# Patient Record
Sex: FEMALE | Race: OTHER | HISPANIC OR LATINO | Employment: FULL TIME | URBAN - METROPOLITAN AREA
[De-identification: names, ages, dates, MRNs, and addresses within clinical notes are randomized per-mention and may not be internally consistent; named-entity substitution may affect disease eponyms.]

---

## 2024-03-29 ENCOUNTER — PATIENT MESSAGE (OUTPATIENT)
Dept: OBSTETRICS AND GYNECOLOGY | Facility: CLINIC | Age: 34
End: 2024-03-29
Payer: OTHER GOVERNMENT

## 2024-06-06 ENCOUNTER — OFFICE VISIT (OUTPATIENT)
Dept: OBSTETRICS AND GYNECOLOGY | Facility: CLINIC | Age: 34
End: 2024-06-06
Payer: OTHER GOVERNMENT

## 2024-06-06 VITALS
SYSTOLIC BLOOD PRESSURE: 118 MMHG | DIASTOLIC BLOOD PRESSURE: 60 MMHG | BODY MASS INDEX: 30.63 KG/M2 | WEIGHT: 151.69 LBS

## 2024-06-06 DIAGNOSIS — Z11.51 ENCOUNTER FOR SCREENING FOR HUMAN PAPILLOMAVIRUS (HPV): ICD-10-CM

## 2024-06-06 DIAGNOSIS — Z01.419 WOMEN'S ANNUAL ROUTINE GYNECOLOGICAL EXAMINATION: Primary | ICD-10-CM

## 2024-06-06 DIAGNOSIS — Z12.4 ENCOUNTER FOR PAPANICOLAOU SMEAR FOR CERVICAL CANCER SCREENING: ICD-10-CM

## 2024-06-06 DIAGNOSIS — Z31.41 FERTILITY TESTING: ICD-10-CM

## 2024-06-06 PROCEDURE — 99213 OFFICE O/P EST LOW 20 MIN: CPT | Mod: PBBFAC | Performed by: NURSE PRACTITIONER

## 2024-06-06 PROCEDURE — 99999 PR PBB SHADOW E&M-EST. PATIENT-LVL III: CPT | Mod: PBBFAC,,, | Performed by: NURSE PRACTITIONER

## 2024-06-06 PROCEDURE — 99395 PREV VISIT EST AGE 18-39: CPT | Mod: S$PBB,,, | Performed by: NURSE PRACTITIONER

## 2024-06-06 PROCEDURE — 88175 CYTOPATH C/V AUTO FLUID REDO: CPT | Performed by: NURSE PRACTITIONER

## 2024-06-06 PROCEDURE — 87624 HPV HI-RISK TYP POOLED RSLT: CPT | Performed by: NURSE PRACTITIONER

## 2024-06-06 NOTE — PROGRESS NOTES
CC: Annual    HPI: Pt is a 33 y.o.  female who presents for routine annual exam.   The patient participates in regular exercise: Yes.  The patient does not smoke.  The patient wears seatbelts.   Pt denies any domestic violence.    Last pap in 2021. Attempting to conceive since June 2023, positive OPKs reported, desires fertility assessment.      FH:  Breast cancer: none  Colon cancer: none  Ovarian cancer: none  Endometrial cancer: none    ROS:  GENERAL: Feeling well overall. Denies fever or chills.   SKIN: Denies rash or lesions.   HEAD: Denies head injury or headache.   NODES: Denies enlarged lymph nodes.   CHEST: Denies chest pain or shortness of breath.   CARDIOVASCULAR: Denies palpitations or left sided chest pain.   ABDOMEN: No abdominal pain, constipation, diarrhea, nausea, vomiting or rectal bleeding.   URINARY: No dysuria, hematuria, or burning on urination.  REPRODUCTIVE: See HPI.   BREASTS: Denies pain, lumps, or nipple discharge.   HEMATOLOGIC: No easy bruisability or excessive bleeding.   MUSCULOSKELETAL: Denies joint pain or swelling.   NEUROLOGIC: Denies syncope or weakness.   PSYCHIATRIC: Denies depression, anxiety or mood swings.    PE:   APPEARANCE: Well nourished, well developed, Other female in no acute distress.  NODES: no cervical, supraclavicular, or inguinal lymphadenopathy  BREASTS: Symmetrical, no skin changes or visible lesions. No palpable masses, nipple discharge or adenopathy bilaterally.  ABDOMEN: Soft. No tenderness or masses. No distention. No hernias palpated. No CVA tenderness.  VULVA: No lesions. Normal external female genitalia.  URETHRAL MEATUS: Normal size and location, no lesions, no prolapse.  URETHRA: No masses, tenderness, or prolapse.  VAGINA: Moist. No lesions or lacerations noted. No abnormal discharge present. No odor present.   CERVIX: No lesions or discharge. No cervical motion tenderness.   UTERUS: Normal size, regular shape, mobile, non-tender.  ADNEXA: No  tenderness. No fullness or masses palpated in the adnexal regions.   ANUS PERINEUM: Normal.      Diagnosis:  1. Women's annual routine gynecological examination    2. Encounter for screening for human papillomavirus (HPV)    3. Encounter for Papanicolaou smear for cervical cancer screening    4. Fertility testing        Plan:     Orders Placed This Encounter    HPV High Risk Genotypes, PCR    FOLLICLE STIMULATING HORMONE    ESTRADIOL    ANTIMULLERIAN HORMONE ([facility])    Prolactin    TSH    Liquid-Based Pap Smear, Screening     HPV/Pap    Day 3 labs ordered, SA with JANET, plan for HSG if initial testing negative. Will schedule day 21.     Patient was counseled today on the new ACS guidelines for cervical cytology screening as well as the current recommendations for breast cancer screening. She was counseled to follow up with her PCP for other routine health maintenance. Counseling session lasted approximately 10 minutes, and all her questions were answered.    Follow-up with me in 1 year for routine exam       BERTRAND Griffin

## 2024-06-11 ENCOUNTER — TELEPHONE (OUTPATIENT)
Dept: OBSTETRICS AND GYNECOLOGY | Facility: CLINIC | Age: 34
End: 2024-06-11
Payer: OTHER GOVERNMENT

## 2024-06-11 NOTE — TELEPHONE ENCOUNTER
----- Message from Mikayla Espinosa NP sent at 6/6/2024  9:17 AM CDT -----  Please set up partner through [facility]AN[facility] for SA

## 2024-06-12 ENCOUNTER — PATIENT MESSAGE (OUTPATIENT)
Dept: OBSTETRICS AND GYNECOLOGY | Facility: CLINIC | Age: 34
End: 2024-06-12
Payer: OTHER GOVERNMENT

## 2024-06-12 LAB
FINAL PATHOLOGIC DIAGNOSIS: NORMAL
HPV HR 12 DNA SPEC QL NAA+PROBE: NEGATIVE
HPV16 AG SPEC QL: NEGATIVE
HPV18 DNA SPEC QL NAA+PROBE: NEGATIVE
Lab: NORMAL

## 2024-06-13 ENCOUNTER — PATIENT MESSAGE (OUTPATIENT)
Dept: OBSTETRICS AND GYNECOLOGY | Facility: CLINIC | Age: 34
End: 2024-06-13
Payer: OTHER GOVERNMENT

## 2024-06-25 ENCOUNTER — PATIENT MESSAGE (OUTPATIENT)
Dept: OBSTETRICS AND GYNECOLOGY | Facility: CLINIC | Age: 34
End: 2024-06-25
Payer: OTHER GOVERNMENT

## 2024-06-27 NOTE — TELEPHONE ENCOUNTER
Contacted patient and scheduled her for labs on Monday. I informed her that I faxed over the referral for the semen analysis  and that she or her  would have to  the referral at [facility] or [facility] location along with the specimen up.         Carina ECHAVARRIA

## 2024-07-05 ENCOUNTER — CLINICAL SUPPORT (OUTPATIENT)
Dept: OBSTETRICS AND GYNECOLOGY | Facility: CLINIC | Age: 34
End: 2024-07-05
Payer: OTHER GOVERNMENT

## 2024-07-05 ENCOUNTER — PATIENT MESSAGE (OUTPATIENT)
Dept: OBSTETRICS AND GYNECOLOGY | Facility: CLINIC | Age: 34
End: 2024-07-05

## 2024-07-05 DIAGNOSIS — N91.2 AMENORRHEA: Primary | ICD-10-CM

## 2024-07-05 PROCEDURE — 99999 PR PBB SHADOW E&M-EST. PATIENT-LVL II: CPT | Mod: PBBFAC,,,

## 2024-07-05 PROCEDURE — 99212 OFFICE O/P EST SF 10 MIN: CPT | Mod: PBBFAC

## 2024-07-05 NOTE — PROGRESS NOTES
Spoke with patient for a total of 30 minutes during virtual visit.  Updated chart to reflect up to date patient demographics.  Allergies, medications, pharmacy, medical/family history and OB history updated.  Patient was guided through expectations of care during pregnancy.  Pregnancy confirmation, dating u/s & first routine OB appts scheduled.  Education provided & questions answered. Encouraged to send message or call office with any questions/concerns. Verbalized understanding.     Discussed with pt:    Encouraged to begin taking PNV   denies n/v  C/o mild cramping/no spotting  Precautions discussed  Referred to [email or web address] for Preg A to Z guide & class schedule   Discussed benefits of breastfeeding - plans to breastfeed   Discussed need for pediatrician

## 2024-07-29 ENCOUNTER — HOSPITAL ENCOUNTER (OUTPATIENT)
Dept: PERINATAL CARE | Facility: OTHER | Age: 34
Discharge: HOME | End: 2024-07-29
Payer: OTHER GOVERNMENT

## 2024-07-29 ENCOUNTER — OFFICE VISIT (OUTPATIENT)
Dept: OBSTETRICS AND GYNECOLOGY | Facility: CLINIC | Age: 34
End: 2024-07-29
Payer: OTHER GOVERNMENT

## 2024-07-29 VITALS
DIASTOLIC BLOOD PRESSURE: 82 MMHG | SYSTOLIC BLOOD PRESSURE: 112 MMHG | BODY MASS INDEX: 30.91 KG/M2 | WEIGHT: 157.44 LBS | HEIGHT: 60 IN

## 2024-07-29 DIAGNOSIS — N91.2 AMENORRHEA: ICD-10-CM

## 2024-07-29 DIAGNOSIS — Z32.01 POSITIVE PREGNANCY TEST: ICD-10-CM

## 2024-07-29 DIAGNOSIS — Z11.3 SCREEN FOR STD (SEXUALLY TRANSMITTED DISEASE): ICD-10-CM

## 2024-07-29 DIAGNOSIS — N91.2 AMENORRHEA: Primary | ICD-10-CM

## 2024-07-29 PROCEDURE — 99214 OFFICE O/P EST MOD 30 MIN: CPT | Mod: S$PBB,,,

## 2024-07-29 PROCEDURE — 99213 OFFICE O/P EST LOW 20 MIN: CPT | Mod: PBBFAC,25

## 2024-07-29 PROCEDURE — 76801 OB US < 14 WKS SINGLE FETUS: CPT | Mod: 26,,, | Performed by: OBSTETRICS & GYNECOLOGY

## 2024-07-29 PROCEDURE — 87086 URINE CULTURE/COLONY COUNT: CPT

## 2024-07-29 PROCEDURE — 87491 CHLMYD TRACH DNA AMP PROBE: CPT

## 2024-07-29 PROCEDURE — 99999 PR PBB SHADOW E&M-EST. PATIENT-LVL III: CPT | Mod: PBBFAC,,,

## 2024-07-29 PROCEDURE — 76801 OB US < 14 WKS SINGLE FETUS: CPT

## 2024-07-29 NOTE — PATIENT INSTRUCTIONS
1) Eat small frequent meals through the day versus three large meals  2) Try ginger ale or sprite to help settle the stomach   3) Eat crackers or dry toast before getting out of bed in the morning   4) Stay hydrated by drinking plenty of water-do not immediately eat or drink something after vomiting. Give your stomach a rest for 20-30 minutes. Slowly reintroduce ice chips, small sips water, crackers, etc.    5) Try OTC Unisom (use the tablets that have doxylamine not diphenhydramine in them, can use generic brands like Equate) and vitamin B6  (25 mg, can find on [employer]) together at night before bed. This can help with the nausea in the morning and is safe to use during pregnancy. You can also take the vitamin B6 alone, every 8 hours to help with the nausea.     If you are unable to keep anything down and constantly vomiting for more that 24 hours, let the office know so that dehydration can be avoided. We would recommend being seen in the emergency department if this is the case.      Call [phone] to see about coverage and any out of pocket costs regarding the [id number] (MT21) testing. This is done any day after 11 weeks and is blood work only. It checks for any chromosomal abnormalities like Down Syndrome. You can also find out the sex of the baby if you choose to know. Once you find out coverage and decide to proceed, send either myself or your doctor a message and we can see what date you can do it. It is done at our second floor lab at [facility].      The sequential screen is a test done around 12-14 weeks that consists of an ultrasound that measures the nuchal fold (neck thickness) of baby and blood work on the same day. You get a second set of labs done a few weeks later after 15 weeks. Based on all three of these results, they are able to tell you if you are considered to be low risk or high risk regarding neural tube defects and chromosomal abnormalities like Down Syndrome. If you are at all interested,  I usually place the order today so we do not miss the window. Someone will give you a phone call in a week or two to schedule this. If you do not want it, just tell them no thank you. This test is typically covered by your insurance and is performed by the Maternal Fetal Medicine (MFM) department here at [facility]. It will not tell you the sex of the baby.     Call clinic [phone] or L & D after hours at [phone]

## 2024-07-29 NOTE — PROGRESS NOTES
CC: Positive Pregnancy Test    HISTORY OF PRESENT ILLNESS:    Josie Rouse is a 34 y.o. female, ,  Presents today for a routine exam complaining of amenorrhea and positive home urine pregnancy test.  Patient's last menstrual period was 2024.  Pt reports menses were normal prior to this.  She is not currently on any contraception. Reports nausea. Reports breast tenderness. Denies pelvic pain. Some vaginal spotting.     LMP: 24  EGA: 8w2d (per LMP)  EDC: 3/8/25 (per LMP)    ROS:  GENERAL: No weight changes. No swelling. No fatigue. No fever.  CARDIOVASCULAR: No chest pain. No shortness of breath. No leg cramps.   NEUROLOGICAL: No headaches. No vision changes.  BREASTS: No pain. No lumps. No discharge.  ABDOMEN: No pain. No diarrhea. No constipation.  REPRODUCTIVE: No abnormal bleeding.   VULVA: No pain. No lesions. No itching.  VAGINA: No relaxation. No itching. No odor. No discharge. No lesions.  URINARY: No incontinence. No nocturia. No frequency. No dysuria.    MEDICATIONS AND ALLERGIES:  Reviewed    COMPREHENSIVE GYN HISTORY:  PAP History: Denies abnormal Paps. Last pap 24- NILM, HPV neg   Infection History: Denies STDs. Denies PID.  Benign History: Denies uterine fibroids. Denies ovarian cysts. Denies endometriosis. Denies other conditions.  Cancer History: Denies cervical cancer. Denies uterine cancer or hyperplasia. Denies ovarian cancer. Denies vulvar cancer or pre-cancer. Denies vaginal cancer or pre-cancer. Denies breast cancer. Denies colon cancer.  Sexual Activity History: Reports currently being sexually active  Menstrual History: None.  Contraception: None    /82   Ht 5' (1.524 m)   Wt 71.4 kg (157 lb 6.5 oz)   LMP 2024   BMI 30.74 kg/m²     PE:  AFFECT: Calm, alert and oriented X 3. Interactive during exam  GENERAL: Appears well-nourished, well-developed, in no acute distress.  HEAD: Normocephalic, atraumatic  TEETH: Good dentition.  SKIN: Normal for race, warm, &  dry. No lesions or rashes.  LUNGS: Easy and unlabored  HEART: Regular rate   EXTREMITIES: No cyanosis, clubbing or edema. No calf tenderness.    PROCEDURES:  UPT Positive  Genprobe  Pap up to date     ASSESSMENT/PLAN:  Amenorrhea  Positive urine pregnancy test (ROGER: 3/8/25, EGA: 8w2d based on LMP)    -  Routine prenatal care    Nausea and vomiting in pregnancy    -  Education regarding lifestyle and dietary modifications    -  Advised use of B6/Unisom. Pt will notify us if no relief/worsening symptoms    1st TRIMESTER COUNSELING: Discussed all, booklet provided:  Common complaints of pregnancy  HIV and other routine prenatal tests including  genetic screening  Risk factors identified by prenatal history  Oriented to practice - discussed anticipated course of prenatal care & indications for Ultrasound  Childbirth classes/Hospital facilities   Nutrition and weight gain counseling  Toxoplasmosis precautions (Cats/Raw Meat)  Sexual activity and exercise  Environmental/Work hazards  Travel  Tobacco (Ask, Advise, Assess, Assist, and Arrange), as well as alcohol and drug use  Use of any medications (Including supplements, Vitamins, Herbs, or OTC Drugs)  Domestic violence  Seat belt use    TERATOLOGY COUNSELING:   Discussed indications and options for aneuploidy screening - pamphlets given    -  Pt will let us know     PLAN:  - Dating ultrasound today   - Urine culture sent  - GC/CT collected  - 1T labs ordered   - Pap up to date      FOLLOW-UP in 4 weeks with Dr. Kristen Duran NP    OB/GYN

## 2024-07-29 NOTE — LETTER
July 29, 2024    Josie Rouse  [address]  [place] [address]              [place]  [address] [address]  Phone: [phone]  Fax: [phone]    To Whom It May Concern:    Ms. Rouse is currently under our care for pregnancy.  Estimated Date of Delivery: 3/8/25        Sincerely,    DAVID Hutton NP-C OBGYN

## 2024-07-30 LAB
BACTERIA UR CULT: NORMAL
C TRACH DNA SPEC QL NAA+PROBE: NOT DETECTED
N GONORRHOEA DNA SPEC QL NAA+PROBE: NOT DETECTED

## 2024-08-19 ENCOUNTER — PATIENT MESSAGE (OUTPATIENT)
Dept: OBSTETRICS AND GYNECOLOGY | Facility: CLINIC | Age: 34
End: 2024-08-19
Payer: OTHER GOVERNMENT

## 2024-08-27 ENCOUNTER — PATIENT MESSAGE (OUTPATIENT)
Dept: ADMINISTRATIVE | Facility: OTHER | Age: 34
End: 2024-08-27
Payer: OTHER GOVERNMENT

## 2024-08-27 ENCOUNTER — ROUTINE PRENATAL (OUTPATIENT)
Dept: OBSTETRICS AND GYNECOLOGY | Facility: CLINIC | Age: 34
End: 2024-08-27
Payer: OTHER GOVERNMENT

## 2024-08-27 VITALS — BODY MASS INDEX: 32.03 KG/M2 | SYSTOLIC BLOOD PRESSURE: 112 MMHG | DIASTOLIC BLOOD PRESSURE: 64 MMHG | WEIGHT: 164 LBS

## 2024-08-27 DIAGNOSIS — Z34.00 SUPERVISION OF NORMAL FIRST PREGNANCY, ANTEPARTUM: Primary | ICD-10-CM

## 2024-08-27 DIAGNOSIS — Z36.89 ENCOUNTER FOR FETAL ANATOMIC SURVEY: ICD-10-CM

## 2024-08-27 PROCEDURE — 99999 PR PBB SHADOW E&M-EST. PATIENT-LVL II: CPT | Mod: PBBFAC,,, | Performed by: OBSTETRICS & GYNECOLOGY

## 2024-08-27 PROCEDURE — 99212 OFFICE O/P EST SF 10 MIN: CPT | Mod: PBBFAC | Performed by: OBSTETRICS & GYNECOLOGY

## 2024-08-27 PROCEDURE — 0500F INITIAL PRENATAL CARE VISIT: CPT | Mod: ,,, | Performed by: OBSTETRICS & GYNECOLOGY

## 2024-08-27 RX ORDER — ASPIRIN 81 MG/1
81 TABLET ORAL DAILY
COMMUNITY
Start: 2024-08-27 | End: 2025-06-03

## 2024-08-27 NOTE — PROGRESS NOTES
Pt here for initial OB visit. No complaints. Desires T21 screening. Orders given today.     Anatomy sono ordered.     Connected MOM signup done. Recommended baby ASA 81 daily for G1 risk factor.     RTC 4 weeks.

## 2024-09-06 ENCOUNTER — PATIENT MESSAGE (OUTPATIENT)
Dept: OBSTETRICS AND GYNECOLOGY | Facility: CLINIC | Age: 34
End: 2024-09-06
Payer: OTHER GOVERNMENT

## 2024-09-13 ENCOUNTER — PATIENT MESSAGE (OUTPATIENT)
Dept: OBSTETRICS AND GYNECOLOGY | Facility: CLINIC | Age: 34
End: 2024-09-13
Payer: OTHER GOVERNMENT

## 2024-09-21 ENCOUNTER — PATIENT MESSAGE (OUTPATIENT)
Dept: OTHER | Facility: OTHER | Age: 34
End: 2024-09-21
Payer: OTHER GOVERNMENT

## 2024-09-26 ENCOUNTER — ROUTINE PRENATAL (OUTPATIENT)
Dept: OBSTETRICS AND GYNECOLOGY | Facility: CLINIC | Age: 34
End: 2024-09-26
Payer: OTHER GOVERNMENT

## 2024-09-26 VITALS
SYSTOLIC BLOOD PRESSURE: 124 MMHG | DIASTOLIC BLOOD PRESSURE: 78 MMHG | BODY MASS INDEX: 33.54 KG/M2 | WEIGHT: 171.75 LBS

## 2024-09-26 DIAGNOSIS — Z78.9 BREASTFEEDING (INFANT): ICD-10-CM

## 2024-09-26 DIAGNOSIS — Z34.00 SUPERVISION OF NORMAL FIRST PREGNANCY, ANTEPARTUM: Primary | ICD-10-CM

## 2024-09-26 PROCEDURE — 99212 OFFICE O/P EST SF 10 MIN: CPT | Mod: PBBFAC | Performed by: OBSTETRICS & GYNECOLOGY

## 2024-09-26 PROCEDURE — 0502F SUBSEQUENT PRENATAL CARE: CPT | Mod: ,,, | Performed by: OBSTETRICS & GYNECOLOGY

## 2024-09-26 PROCEDURE — 99999 PR PBB SHADOW E&M-EST. PATIENT-LVL II: CPT | Mod: PBBFAC,,, | Performed by: OBSTETRICS & GYNECOLOGY

## 2024-09-26 NOTE — PROGRESS NOTES
Doing well. No complaints. Anatomy scheduled.     Rx breast pump given today.     DM screen, CBC next visit. Recommended flu vaccine.    RTC 8 weeks.

## 2024-09-28 ENCOUNTER — PATIENT MESSAGE (OUTPATIENT)
Dept: OTHER | Facility: OTHER | Age: 34
End: 2024-09-28
Payer: OTHER GOVERNMENT

## 2024-10-02 ENCOUNTER — PATIENT MESSAGE (OUTPATIENT)
Dept: OBSTETRICS AND GYNECOLOGY | Facility: CLINIC | Age: 34
End: 2024-10-02
Payer: OTHER GOVERNMENT

## 2024-10-14 ENCOUNTER — PROCEDURE VISIT (OUTPATIENT)
Dept: MATERNAL FETAL MEDICINE | Facility: CLINIC | Age: 34
End: 2024-10-14
Payer: OTHER GOVERNMENT

## 2024-10-14 DIAGNOSIS — Z36.89 ENCOUNTER FOR FETAL ANATOMIC SURVEY: ICD-10-CM

## 2024-10-14 PROCEDURE — 76805 OB US >/= 14 WKS SNGL FETUS: CPT | Mod: PBBFAC | Performed by: OBSTETRICS & GYNECOLOGY

## 2024-10-19 ENCOUNTER — PATIENT MESSAGE (OUTPATIENT)
Dept: OTHER | Facility: OTHER | Age: 34
End: 2024-10-19
Payer: OTHER GOVERNMENT

## 2024-11-16 ENCOUNTER — PATIENT MESSAGE (OUTPATIENT)
Dept: OTHER | Facility: OTHER | Age: 34
End: 2024-11-16
Payer: OTHER GOVERNMENT

## 2024-11-19 ENCOUNTER — PATIENT MESSAGE (OUTPATIENT)
Dept: OBSTETRICS AND GYNECOLOGY | Facility: CLINIC | Age: 34
End: 2024-11-19

## 2024-11-19 ENCOUNTER — LAB VISIT (OUTPATIENT)
Dept: LAB | Facility: OTHER | Age: 34
End: 2024-11-19
Attending: OBSTETRICS & GYNECOLOGY
Payer: OTHER GOVERNMENT

## 2024-11-19 ENCOUNTER — ROUTINE PRENATAL (OUTPATIENT)
Dept: OBSTETRICS AND GYNECOLOGY | Facility: CLINIC | Age: 34
End: 2024-11-19
Payer: OTHER GOVERNMENT

## 2024-11-19 VITALS
BODY MASS INDEX: 35.31 KG/M2 | WEIGHT: 180.75 LBS | SYSTOLIC BLOOD PRESSURE: 110 MMHG | DIASTOLIC BLOOD PRESSURE: 70 MMHG

## 2024-11-19 DIAGNOSIS — Z34.80 SUPERVISION OF OTHER NORMAL PREGNANCY, ANTEPARTUM: Primary | ICD-10-CM

## 2024-11-19 DIAGNOSIS — O26.843 UTERINE SIZE DATE DISCREPANCY PREGNANCY, THIRD TRIMESTER: ICD-10-CM

## 2024-11-19 DIAGNOSIS — Z34.00 SUPERVISION OF NORMAL FIRST PREGNANCY, ANTEPARTUM: ICD-10-CM

## 2024-11-19 LAB
ERYTHROCYTE [DISTWIDTH] IN BLOOD BY AUTOMATED COUNT: 12.9 % (ref 11.5–14.5)
GLUCOSE SERPL-MCNC: 115 MG/DL (ref 70–140)
HCT VFR BLD AUTO: 31.9 % (ref 37–48.5)
HGB BLD-MCNC: 10.7 G/DL (ref 12–16)
MCH RBC QN AUTO: 29.6 PG (ref 27–31)
MCHC RBC AUTO-ENTMCNC: 33.5 G/DL (ref 32–36)
MCV RBC AUTO: 88 FL (ref 82–98)
PLATELET # BLD AUTO: 400 K/UL (ref 150–450)
PMV BLD AUTO: 9.2 FL (ref 9.2–12.9)
RBC # BLD AUTO: 3.62 M/UL (ref 4–5.4)
WBC # BLD AUTO: 14.87 K/UL (ref 3.9–12.7)

## 2024-11-19 PROCEDURE — 99999 PR PBB SHADOW E&M-EST. PATIENT-LVL II: CPT | Mod: PBBFAC,,, | Performed by: OBSTETRICS & GYNECOLOGY

## 2024-11-19 PROCEDURE — 36415 COLL VENOUS BLD VENIPUNCTURE: CPT | Performed by: OBSTETRICS & GYNECOLOGY

## 2024-11-19 PROCEDURE — 85027 COMPLETE CBC AUTOMATED: CPT | Performed by: OBSTETRICS & GYNECOLOGY

## 2024-11-19 PROCEDURE — 82950 GLUCOSE TEST: CPT | Performed by: OBSTETRICS & GYNECOLOGY

## 2024-11-19 PROCEDURE — 0502F SUBSEQUENT PRENATAL CARE: CPT | Mod: ,,, | Performed by: OBSTETRICS & GYNECOLOGY

## 2024-11-19 PROCEDURE — 99212 OFFICE O/P EST SF 10 MIN: CPT | Mod: PBBFAC | Performed by: OBSTETRICS & GYNECOLOGY

## 2024-11-19 NOTE — PROGRESS NOTES
Pt doing well. No vaginal bleeding, no loss of fluid, positive fetal movement, no contractions. Bleeding/labor/rom/fmc precautions.     Tdap next visit. 32 wk growth sono ordered. Tdap next visit.     RTC 4 weeks.

## 2024-11-30 ENCOUNTER — PATIENT MESSAGE (OUTPATIENT)
Dept: OTHER | Facility: OTHER | Age: 34
End: 2024-11-30
Payer: OTHER GOVERNMENT

## 2024-12-14 ENCOUNTER — PATIENT MESSAGE (OUTPATIENT)
Dept: OTHER | Facility: OTHER | Age: 34
End: 2024-12-14
Payer: OTHER GOVERNMENT

## 2024-12-17 ENCOUNTER — CLINICAL SUPPORT (OUTPATIENT)
Dept: OBSTETRICS AND GYNECOLOGY | Facility: CLINIC | Age: 34
End: 2024-12-17
Payer: OTHER GOVERNMENT

## 2024-12-17 VITALS — BODY MASS INDEX: 36.81 KG/M2 | DIASTOLIC BLOOD PRESSURE: 77 MMHG | SYSTOLIC BLOOD PRESSURE: 112 MMHG | WEIGHT: 188.5 LBS

## 2024-12-17 DIAGNOSIS — Z3A.28 28 WEEKS GESTATION OF PREGNANCY: Primary | ICD-10-CM

## 2024-12-17 DIAGNOSIS — Z34.80 SUPERVISION OF OTHER NORMAL PREGNANCY, ANTEPARTUM: ICD-10-CM

## 2024-12-17 DIAGNOSIS — R31.9 HEMATURIA, UNSPECIFIED TYPE: ICD-10-CM

## 2024-12-17 PROCEDURE — 90471 IMMUNIZATION ADMIN: CPT | Mod: PBBFAC

## 2024-12-17 PROCEDURE — 99213 OFFICE O/P EST LOW 20 MIN: CPT | Mod: PBBFAC,27

## 2024-12-17 PROCEDURE — 99999PBSHW TDAP VACCINE GREATER THAN OR EQUAL TO 7YO IM: Mod: PBBFAC,,,

## 2024-12-17 PROCEDURE — 99999 PR PBB SHADOW E&M-EST. PATIENT-LVL III: CPT | Mod: PBBFAC,,,

## 2024-12-17 PROCEDURE — 87086 URINE CULTURE/COLONY COUNT: CPT

## 2024-12-17 PROCEDURE — 99999 PR PBB SHADOW E&M-EST. PATIENT-LVL I: CPT | Mod: PBBFAC,,,

## 2024-12-17 PROCEDURE — 99211 OFF/OP EST MAY X REQ PHY/QHP: CPT | Mod: PBBFAC

## 2024-12-17 NOTE — PATIENT INSTRUCTIONS
LABOR AND DELIVERY PHONE NUMBER, [phone] (OPEN 24/7, LOCATED ON 6TH FLOOR OF HOSPITAL)  [address] PHONE NUMBER, [phone] (OPEN MON-FRI, 8a-5p)

## 2024-12-17 NOTE — PROGRESS NOTES
Here for routine OB appt at 28w3d, with no complaints. Having trouble sleeping. Discussed safe OTC sleep remedies. TDAP today. RSV vaccine next appt. Growth US scheduled. Very fearful of delivery and having an emergent c/s. Desires to be put to sleep if she has to have a c/s. Discussed risks of general anesthesia. Offered anesthesia consult, declined for now.  Hematuria on UA, sent for urine culture. Reports good FM.  Denies LOF, denies VB, denies contractions. Reviewed warning signs of Labor and Preeclampsia.  Daily FM counts reinforced.  RTC @ 4 weeks

## 2024-12-17 NOTE — PROGRESS NOTES
Here for TDAP injection. Patient without complaint of pain at this time, Injection given. Tolerated well. No pain noted after injection.  Advised to wait in lobby 15 minutes and report any adverse reactions.     Site:left deltoid      Baby Friendly Handout Given to Patient

## 2024-12-18 LAB
BACTERIA UR CULT: NORMAL
BACTERIA UR CULT: NORMAL

## 2025-01-11 ENCOUNTER — PATIENT MESSAGE (OUTPATIENT)
Dept: OTHER | Facility: OTHER | Age: 35
End: 2025-01-11
Payer: OTHER GOVERNMENT

## 2025-01-14 ENCOUNTER — ROUTINE PRENATAL (OUTPATIENT)
Dept: OBSTETRICS AND GYNECOLOGY | Facility: CLINIC | Age: 35
End: 2025-01-14
Payer: OTHER GOVERNMENT

## 2025-01-14 ENCOUNTER — PROCEDURE VISIT (OUTPATIENT)
Dept: MATERNAL FETAL MEDICINE | Facility: CLINIC | Age: 35
End: 2025-01-14
Payer: OTHER GOVERNMENT

## 2025-01-14 VITALS
BODY MASS INDEX: 37.46 KG/M2 | WEIGHT: 191.81 LBS | DIASTOLIC BLOOD PRESSURE: 78 MMHG | SYSTOLIC BLOOD PRESSURE: 110 MMHG

## 2025-01-14 DIAGNOSIS — Z34.00 SUPERVISION OF NORMAL FIRST PREGNANCY, ANTEPARTUM: Primary | ICD-10-CM

## 2025-01-14 DIAGNOSIS — O26.843 UTERINE SIZE DATE DISCREPANCY PREGNANCY, THIRD TRIMESTER: ICD-10-CM

## 2025-01-14 DIAGNOSIS — Z29.11 NEED FOR RSV VACCINATION: Primary | ICD-10-CM

## 2025-01-14 PROCEDURE — 99999PBSHW PR PBB SHADOW TECHNICAL ONLY FILED TO HB: Mod: PBBFAC,,,

## 2025-01-14 PROCEDURE — 99999 PR PBB SHADOW E&M-EST. PATIENT-LVL I: CPT | Mod: PBBFAC,,,

## 2025-01-14 PROCEDURE — 90678 RSV VACC PREF BIVALENT IM: CPT | Mod: PBBFAC

## 2025-01-14 PROCEDURE — 90471 IMMUNIZATION ADMIN: CPT | Mod: PBBFAC

## 2025-01-14 PROCEDURE — 99212 OFFICE O/P EST SF 10 MIN: CPT | Mod: PBBFAC | Performed by: OBSTETRICS & GYNECOLOGY

## 2025-01-14 PROCEDURE — 0502F SUBSEQUENT PRENATAL CARE: CPT | Mod: ,,, | Performed by: OBSTETRICS & GYNECOLOGY

## 2025-01-14 PROCEDURE — 76816 OB US FOLLOW-UP PER FETUS: CPT | Mod: PBBFAC | Performed by: OBSTETRICS & GYNECOLOGY

## 2025-01-14 PROCEDURE — 99999 PR PBB SHADOW E&M-EST. PATIENT-LVL II: CPT | Mod: PBBFAC,,, | Performed by: OBSTETRICS & GYNECOLOGY

## 2025-01-14 RX ADMIN — Medication 120 MCG: at 01:01

## 2025-01-14 NOTE — PROGRESS NOTES
Pt doing well. No vaginal bleeding, no loss of fluid, positive fetal movement, no contractions. Bleeding/labor/rom/fmc precautions.     32 wk growth sono today. Will do consents at next visit. RSV vaccine done today.     3T labs, GBS next visit. RTC 3 weeks.

## 2025-01-14 NOTE — PROGRESS NOTES
Patient here for abrysvo injection. No pain noted, injection given. Patient tolerated well advised to wait in lobby 5 minutes and report any adverse reactions.       Site: leon

## 2025-01-28 ENCOUNTER — LAB VISIT (OUTPATIENT)
Dept: LAB | Facility: OTHER | Age: 35
End: 2025-01-28
Attending: OBSTETRICS & GYNECOLOGY
Payer: OTHER GOVERNMENT

## 2025-01-28 ENCOUNTER — ROUTINE PRENATAL (OUTPATIENT)
Dept: OBSTETRICS AND GYNECOLOGY | Facility: CLINIC | Age: 35
End: 2025-01-28
Payer: OTHER GOVERNMENT

## 2025-01-28 VITALS — DIASTOLIC BLOOD PRESSURE: 92 MMHG | WEIGHT: 194 LBS | SYSTOLIC BLOOD PRESSURE: 132 MMHG | BODY MASS INDEX: 37.89 KG/M2

## 2025-01-28 DIAGNOSIS — Z34.00 SUPERVISION OF NORMAL FIRST PREGNANCY, ANTEPARTUM: Primary | ICD-10-CM

## 2025-01-28 DIAGNOSIS — Z34.00 SUPERVISION OF NORMAL FIRST PREGNANCY, ANTEPARTUM: ICD-10-CM

## 2025-01-28 LAB
ALBUMIN SERPL BCP-MCNC: 2.8 G/DL (ref 3.5–5.2)
ALP SERPL-CCNC: 158 U/L (ref 40–150)
ALT SERPL W/O P-5'-P-CCNC: 8 U/L (ref 10–44)
ANION GAP SERPL CALC-SCNC: 10 MMOL/L (ref 8–16)
AST SERPL-CCNC: 12 U/L (ref 10–40)
BASOPHILS # BLD AUTO: 0.04 K/UL (ref 0–0.2)
BASOPHILS NFR BLD: 0.3 % (ref 0–1.9)
BILIRUB SERPL-MCNC: 0.2 MG/DL (ref 0.1–1)
BUN SERPL-MCNC: 8 MG/DL (ref 6–20)
CALCIUM SERPL-MCNC: 9.9 MG/DL (ref 8.7–10.5)
CHLORIDE SERPL-SCNC: 106 MMOL/L (ref 95–110)
CO2 SERPL-SCNC: 20 MMOL/L (ref 23–29)
CREAT SERPL-MCNC: 0.6 MG/DL (ref 0.5–1.4)
CREAT UR-MCNC: 58 MG/DL (ref 15–325)
DIFFERENTIAL METHOD BLD: ABNORMAL
EOSINOPHIL # BLD AUTO: 0.1 K/UL (ref 0–0.5)
EOSINOPHIL NFR BLD: 0.4 % (ref 0–8)
ERYTHROCYTE [DISTWIDTH] IN BLOOD BY AUTOMATED COUNT: 13 % (ref 11.5–14.5)
EST. GFR  (NO RACE VARIABLE): >60 ML/MIN/1.73 M^2
GLUCOSE SERPL-MCNC: 82 MG/DL (ref 70–110)
HCT VFR BLD AUTO: 33.3 % (ref 37–48.5)
HGB BLD-MCNC: 11.2 G/DL (ref 12–16)
HIV 1+2 AB+HIV1 P24 AG SERPL QL IA: NEGATIVE
IMM GRANULOCYTES # BLD AUTO: 0.09 K/UL (ref 0–0.04)
IMM GRANULOCYTES NFR BLD AUTO: 0.6 % (ref 0–0.5)
LYMPHOCYTES # BLD AUTO: 2.8 K/UL (ref 1–4.8)
LYMPHOCYTES NFR BLD: 19 % (ref 18–48)
MCH RBC QN AUTO: 28.6 PG (ref 27–31)
MCHC RBC AUTO-ENTMCNC: 33.6 G/DL (ref 32–36)
MCV RBC AUTO: 85 FL (ref 82–98)
MONOCYTES # BLD AUTO: 0.9 K/UL (ref 0.3–1)
MONOCYTES NFR BLD: 6.2 % (ref 4–15)
NEUTROPHILS # BLD AUTO: 10.6 K/UL (ref 1.8–7.7)
NEUTROPHILS NFR BLD: 73.5 % (ref 38–73)
NRBC BLD-RTO: 0 /100 WBC
PLATELET # BLD AUTO: 352 K/UL (ref 150–450)
PMV BLD AUTO: 10 FL (ref 9.2–12.9)
POTASSIUM SERPL-SCNC: 4.2 MMOL/L (ref 3.5–5.1)
PROT SERPL-MCNC: 7 G/DL (ref 6–8.4)
PROT UR-MCNC: 9 MG/DL (ref 0–15)
PROT/CREAT UR: 0.16 MG/G{CREAT} (ref 0–0.2)
RBC # BLD AUTO: 3.91 M/UL (ref 4–5.4)
SODIUM SERPL-SCNC: 136 MMOL/L (ref 136–145)
TREPONEMA PALLIDUM IGG+IGM AB [PRESENCE] IN SERUM OR PLASMA BY IMMUNOASSAY: NONREACTIVE
WBC # BLD AUTO: 14.48 K/UL (ref 3.9–12.7)

## 2025-01-28 PROCEDURE — 99212 OFFICE O/P EST SF 10 MIN: CPT | Mod: PBBFAC | Performed by: OBSTETRICS & GYNECOLOGY

## 2025-01-28 PROCEDURE — 0502F SUBSEQUENT PRENATAL CARE: CPT | Mod: ,,, | Performed by: OBSTETRICS & GYNECOLOGY

## 2025-01-28 PROCEDURE — 85025 COMPLETE CBC W/AUTO DIFF WBC: CPT | Performed by: OBSTETRICS & GYNECOLOGY

## 2025-01-28 PROCEDURE — 87389 HIV-1 AG W/HIV-1&-2 AB AG IA: CPT | Performed by: OBSTETRICS & GYNECOLOGY

## 2025-01-28 PROCEDURE — 84156 ASSAY OF PROTEIN URINE: CPT | Performed by: OBSTETRICS & GYNECOLOGY

## 2025-01-28 PROCEDURE — 86593 SYPHILIS TEST NON-TREP QUANT: CPT | Performed by: OBSTETRICS & GYNECOLOGY

## 2025-01-28 PROCEDURE — 36415 COLL VENOUS BLD VENIPUNCTURE: CPT | Performed by: OBSTETRICS & GYNECOLOGY

## 2025-01-28 PROCEDURE — 99999 PR PBB SHADOW E&M-EST. PATIENT-LVL II: CPT | Mod: PBBFAC,,, | Performed by: OBSTETRICS & GYNECOLOGY

## 2025-01-28 PROCEDURE — 80053 COMPREHEN METABOLIC PANEL: CPT | Performed by: OBSTETRICS & GYNECOLOGY

## 2025-01-28 NOTE — PROGRESS NOTES
Pt doing well. No vaginal bleeding, no loss of fluid, positive fetal movement, no contractions. Bleeding/labor/rom/fmc precautions.     Having difficulty with sleeping. Tried Unisom and it made her very groggy after 8 hours. Discussed trying Benadryl and Magnesium. Will try those and let me know.     Feels like hands are swelling. /92. Denies headaches.Will get baseline labs, p/c ratio today.     Delivery consents signed today.     RTC 2 weeks.

## 2025-01-29 ENCOUNTER — PATIENT MESSAGE (OUTPATIENT)
Dept: OBSTETRICS AND GYNECOLOGY | Facility: CLINIC | Age: 35
End: 2025-01-29
Payer: OTHER GOVERNMENT

## 2025-02-10 ENCOUNTER — PATIENT MESSAGE (OUTPATIENT)
Dept: OBSTETRICS AND GYNECOLOGY | Facility: CLINIC | Age: 35
End: 2025-02-10
Payer: OTHER GOVERNMENT

## 2025-02-11 ENCOUNTER — LAB VISIT (OUTPATIENT)
Dept: LAB | Facility: OTHER | Age: 35
End: 2025-02-11
Attending: OBSTETRICS & GYNECOLOGY
Payer: OTHER GOVERNMENT

## 2025-02-11 ENCOUNTER — ROUTINE PRENATAL (OUTPATIENT)
Dept: OBSTETRICS AND GYNECOLOGY | Facility: CLINIC | Age: 35
End: 2025-02-11
Payer: OTHER GOVERNMENT

## 2025-02-11 VITALS
WEIGHT: 196.88 LBS | DIASTOLIC BLOOD PRESSURE: 98 MMHG | SYSTOLIC BLOOD PRESSURE: 142 MMHG | BODY MASS INDEX: 38.45 KG/M2

## 2025-02-11 DIAGNOSIS — O13.3 GESTATIONAL HYPERTENSION, THIRD TRIMESTER: Primary | ICD-10-CM

## 2025-02-11 DIAGNOSIS — O16.3 ELEVATED BLOOD PRESSURE AFFECTING PREGNANCY IN THIRD TRIMESTER, ANTEPARTUM: ICD-10-CM

## 2025-02-11 DIAGNOSIS — Z34.00 SUPERVISION OF NORMAL FIRST PREGNANCY, ANTEPARTUM: ICD-10-CM

## 2025-02-11 LAB
ALBUMIN SERPL BCP-MCNC: 3 G/DL (ref 3.5–5.2)
ALP SERPL-CCNC: 190 U/L (ref 40–150)
ALT SERPL W/O P-5'-P-CCNC: 10 U/L (ref 10–44)
ANION GAP SERPL CALC-SCNC: 10 MMOL/L (ref 8–16)
AST SERPL-CCNC: 14 U/L (ref 10–40)
BASOPHILS # BLD AUTO: 0.05 K/UL (ref 0–0.2)
BASOPHILS NFR BLD: 0.4 % (ref 0–1.9)
BILIRUB SERPL-MCNC: 0.2 MG/DL (ref 0.1–1)
BUN SERPL-MCNC: 12 MG/DL (ref 6–20)
CALCIUM SERPL-MCNC: 9.6 MG/DL (ref 8.7–10.5)
CHLORIDE SERPL-SCNC: 106 MMOL/L (ref 95–110)
CO2 SERPL-SCNC: 19 MMOL/L (ref 23–29)
CREAT SERPL-MCNC: 0.7 MG/DL (ref 0.5–1.4)
CREAT UR-MCNC: 72 MG/DL (ref 15–325)
DIFFERENTIAL METHOD BLD: ABNORMAL
EOSINOPHIL # BLD AUTO: 0.1 K/UL (ref 0–0.5)
EOSINOPHIL NFR BLD: 0.6 % (ref 0–8)
ERYTHROCYTE [DISTWIDTH] IN BLOOD BY AUTOMATED COUNT: 13.1 % (ref 11.5–14.5)
EST. GFR  (NO RACE VARIABLE): >60 ML/MIN/1.73 M^2
GLUCOSE SERPL-MCNC: 80 MG/DL (ref 70–110)
HCT VFR BLD AUTO: 33.8 % (ref 37–48.5)
HGB BLD-MCNC: 11.1 G/DL (ref 12–16)
HIV 1+2 AB+HIV1 P24 AG SERPL QL IA: NEGATIVE
IMM GRANULOCYTES # BLD AUTO: 0.09 K/UL (ref 0–0.04)
IMM GRANULOCYTES NFR BLD AUTO: 0.7 % (ref 0–0.5)
LYMPHOCYTES # BLD AUTO: 3 K/UL (ref 1–4.8)
LYMPHOCYTES NFR BLD: 23.2 % (ref 18–48)
MCH RBC QN AUTO: 28 PG (ref 27–31)
MCHC RBC AUTO-ENTMCNC: 32.8 G/DL (ref 32–36)
MCV RBC AUTO: 85 FL (ref 82–98)
MONOCYTES # BLD AUTO: 0.8 K/UL (ref 0.3–1)
MONOCYTES NFR BLD: 6.1 % (ref 4–15)
NEUTROPHILS # BLD AUTO: 8.8 K/UL (ref 1.8–7.7)
NEUTROPHILS NFR BLD: 69 % (ref 38–73)
NRBC BLD-RTO: 0 /100 WBC
PLATELET # BLD AUTO: 324 K/UL (ref 150–450)
PMV BLD AUTO: 10.5 FL (ref 9.2–12.9)
POTASSIUM SERPL-SCNC: 4.5 MMOL/L (ref 3.5–5.1)
PROT SERPL-MCNC: 7.1 G/DL (ref 6–8.4)
PROT UR-MCNC: 10 MG/DL (ref 0–15)
PROT/CREAT UR: 0.14 MG/G{CREAT} (ref 0–0.2)
RBC # BLD AUTO: 3.97 M/UL (ref 4–5.4)
SODIUM SERPL-SCNC: 135 MMOL/L (ref 136–145)
TREPONEMA PALLIDUM IGG+IGM AB [PRESENCE] IN SERUM OR PLASMA BY IMMUNOASSAY: NONREACTIVE
WBC # BLD AUTO: 12.71 K/UL (ref 3.9–12.7)

## 2025-02-11 PROCEDURE — 86593 SYPHILIS TEST NON-TREP QUANT: CPT | Performed by: OBSTETRICS & GYNECOLOGY

## 2025-02-11 PROCEDURE — 87389 HIV-1 AG W/HIV-1&-2 AB AG IA: CPT | Performed by: OBSTETRICS & GYNECOLOGY

## 2025-02-11 PROCEDURE — 99999 PR PBB SHADOW E&M-EST. PATIENT-LVL II: CPT | Mod: PBBFAC,,, | Performed by: OBSTETRICS & GYNECOLOGY

## 2025-02-11 PROCEDURE — 87081 CULTURE SCREEN ONLY: CPT | Performed by: OBSTETRICS & GYNECOLOGY

## 2025-02-11 PROCEDURE — 84156 ASSAY OF PROTEIN URINE: CPT | Performed by: OBSTETRICS & GYNECOLOGY

## 2025-02-11 PROCEDURE — 80053 COMPREHEN METABOLIC PANEL: CPT | Performed by: OBSTETRICS & GYNECOLOGY

## 2025-02-11 PROCEDURE — 99212 OFFICE O/P EST SF 10 MIN: CPT | Mod: PBBFAC | Performed by: OBSTETRICS & GYNECOLOGY

## 2025-02-11 PROCEDURE — 36415 COLL VENOUS BLD VENIPUNCTURE: CPT | Performed by: OBSTETRICS & GYNECOLOGY

## 2025-02-11 PROCEDURE — 85025 COMPLETE CBC W/AUTO DIFF WBC: CPT | Performed by: OBSTETRICS & GYNECOLOGY

## 2025-02-11 NOTE — PROGRESS NOTES
Pt doing well. BP mildly elevated again today and pt now meets criteria for GHTN. Connected MOM BPs have all been elevated as well. Pt denies any headaches or RUQ pain. Will send for labs and P/C ratio today. PNT on Friday to ensure BPs are still mild range. If PNT looks good will plan for IOL on Monday at 4 pm.     SVE 1/60/-4, soft. Will plan for CRB and Pitocin upon admit AFTER epidural.     GBS, Pre E labs today.

## 2025-02-14 ENCOUNTER — HOSPITAL ENCOUNTER (OUTPATIENT)
Dept: PERINATAL CARE | Facility: OTHER | Age: 35
Discharge: HOME | End: 2025-02-14
Attending: OBSTETRICS & GYNECOLOGY
Payer: OTHER GOVERNMENT

## 2025-02-14 DIAGNOSIS — O13.3 GESTATIONAL HYPERTENSION, THIRD TRIMESTER: ICD-10-CM

## 2025-02-14 LAB — BACTERIA SPEC AEROBE CULT: NORMAL

## 2025-02-14 PROCEDURE — 59025 FETAL NON-STRESS TEST: CPT

## 2025-02-14 PROCEDURE — 76815 OB US LIMITED FETUS(S): CPT | Mod: 26,,, | Performed by: OBSTETRICS & GYNECOLOGY

## 2025-02-14 PROCEDURE — 59025 FETAL NON-STRESS TEST: CPT | Mod: 26,,, | Performed by: OBSTETRICS & GYNECOLOGY

## 2025-02-14 PROCEDURE — 76815 OB US LIMITED FETUS(S): CPT

## 2025-02-17 ENCOUNTER — PATIENT MESSAGE (OUTPATIENT)
Dept: OBSTETRICS AND GYNECOLOGY | Facility: OTHER | Age: 35
End: 2025-02-17
Payer: OTHER GOVERNMENT

## 2025-02-17 ENCOUNTER — HOSPITAL ENCOUNTER (INPATIENT)
Facility: OTHER | Age: 35
LOS: 4 days | Discharge: HOME | End: 2025-02-21
Attending: OBSTETRICS & GYNECOLOGY | Admitting: OBSTETRICS & GYNECOLOGY
Payer: OTHER GOVERNMENT

## 2025-02-17 DIAGNOSIS — Z34.90 ENCOUNTER FOR INDUCTION OF LABOR: Primary | ICD-10-CM

## 2025-02-17 DIAGNOSIS — O13.3 GESTATIONAL HYPERTENSION, THIRD TRIMESTER: ICD-10-CM

## 2025-02-17 PROBLEM — O99.213 SEVERE OBESITY DUE TO EXCESS CALORIES AFFECTING PREGNANCY IN THIRD TRIMESTER: Status: ACTIVE | Noted: 2025-02-17

## 2025-02-17 PROBLEM — E66.811 CLASS 1 OBESITY DUE TO EXCESS CALORIES WITHOUT SERIOUS COMORBIDITY WITH BODY MASS INDEX (BMI) OF 30.0 TO 30.9 IN ADULT: Status: ACTIVE | Noted: 2025-02-17

## 2025-02-17 PROBLEM — O14.13 SEVERE PRE-ECLAMPSIA IN THIRD TRIMESTER: Status: ACTIVE | Noted: 2025-02-17

## 2025-02-17 PROBLEM — J45.909 ASTHMA: Status: ACTIVE | Noted: 2025-02-17

## 2025-02-17 PROBLEM — E66.09 CLASS 1 OBESITY DUE TO EXCESS CALORIES WITHOUT SERIOUS COMORBIDITY WITH BODY MASS INDEX (BMI) OF 30.0 TO 30.9 IN ADULT: Status: ACTIVE | Noted: 2025-02-17

## 2025-02-17 PROBLEM — E66.01 SEVERE OBESITY DUE TO EXCESS CALORIES AFFECTING PREGNANCY IN THIRD TRIMESTER: Status: ACTIVE | Noted: 2025-02-17

## 2025-02-17 PROBLEM — N91.2 AMENORRHEA: Status: RESOLVED | Noted: 2024-07-29 | Resolved: 2025-02-17

## 2025-02-17 LAB
ABO + RH BLD: NORMAL
ALBUMIN SERPL BCP-MCNC: 2.9 G/DL (ref 3.5–5.2)
ALP SERPL-CCNC: 187 U/L (ref 40–150)
ALT SERPL W/O P-5'-P-CCNC: 8 U/L (ref 10–44)
ANION GAP SERPL CALC-SCNC: 10 MMOL/L (ref 8–16)
AST SERPL-CCNC: 14 U/L (ref 10–40)
BASOPHILS # BLD AUTO: 0.04 K/UL (ref 0–0.2)
BASOPHILS NFR BLD: 0.3 % (ref 0–1.9)
BILIRUB SERPL-MCNC: 0.2 MG/DL (ref 0.1–1)
BLD GP AB SCN CELLS X3 SERPL QL: NORMAL
BUN SERPL-MCNC: 12 MG/DL (ref 6–20)
CALCIUM SERPL-MCNC: 9.3 MG/DL (ref 8.7–10.5)
CHLORIDE SERPL-SCNC: 107 MMOL/L (ref 95–110)
CO2 SERPL-SCNC: 19 MMOL/L (ref 23–29)
CREAT SERPL-MCNC: 0.7 MG/DL (ref 0.5–1.4)
CREAT UR-MCNC: 215.8 MG/DL (ref 15–325)
DIFFERENTIAL METHOD BLD: ABNORMAL
EOSINOPHIL # BLD AUTO: 0.1 K/UL (ref 0–0.5)
EOSINOPHIL NFR BLD: 0.5 % (ref 0–8)
ERYTHROCYTE [DISTWIDTH] IN BLOOD BY AUTOMATED COUNT: 13.4 % (ref 11.5–14.5)
EST. GFR  (NO RACE VARIABLE): >60 ML/MIN/1.73 M^2
GLUCOSE SERPL-MCNC: 102 MG/DL (ref 70–110)
HCT VFR BLD AUTO: 30.3 % (ref 37–48.5)
HGB BLD-MCNC: 10.4 G/DL (ref 12–16)
IMM GRANULOCYTES # BLD AUTO: 0.07 K/UL (ref 0–0.04)
IMM GRANULOCYTES NFR BLD AUTO: 0.6 % (ref 0–0.5)
LYMPHOCYTES # BLD AUTO: 2.7 K/UL (ref 1–4.8)
LYMPHOCYTES NFR BLD: 21.8 % (ref 18–48)
MCH RBC QN AUTO: 29 PG (ref 27–31)
MCHC RBC AUTO-ENTMCNC: 34.3 G/DL (ref 32–36)
MCV RBC AUTO: 84 FL (ref 82–98)
MONOCYTES # BLD AUTO: 0.8 K/UL (ref 0.3–1)
MONOCYTES NFR BLD: 6.2 % (ref 4–15)
NEUTROPHILS # BLD AUTO: 8.8 K/UL (ref 1.8–7.7)
NEUTROPHILS NFR BLD: 70.6 % (ref 38–73)
NRBC BLD-RTO: 0 /100 WBC
PLATELET # BLD AUTO: 268 K/UL (ref 150–450)
PMV BLD AUTO: 11.2 FL (ref 9.2–12.9)
POTASSIUM SERPL-SCNC: 4.2 MMOL/L (ref 3.5–5.1)
PROT SERPL-MCNC: 6.7 G/DL (ref 6–8.4)
PROT UR-MCNC: 29 MG/DL (ref 0–15)
PROT/CREAT UR: 0.13 MG/G{CREAT} (ref 0–0.2)
RBC # BLD AUTO: 3.59 M/UL (ref 4–5.4)
SODIUM SERPL-SCNC: 136 MMOL/L (ref 136–145)
SPECIMEN OUTDATE: NORMAL
TREPONEMA PALLIDUM IGG+IGM AB [PRESENCE] IN SERUM OR PLASMA BY IMMUNOASSAY: NONREACTIVE
WBC # BLD AUTO: 12.44 K/UL (ref 3.9–12.7)

## 2025-02-17 PROCEDURE — 27200710 HC EPIDURAL INFUSION PUMP SET: Performed by: ANESTHESIOLOGY

## 2025-02-17 PROCEDURE — 85025 COMPLETE CBC W/AUTO DIFF WBC: CPT

## 2025-02-17 PROCEDURE — 86593 SYPHILIS TEST NON-TREP QUANT: CPT

## 2025-02-17 PROCEDURE — 59200 INSERT CERVICAL DILATOR: CPT

## 2025-02-17 PROCEDURE — 51702 INSERT TEMP BLADDER CATH: CPT

## 2025-02-17 PROCEDURE — 25000003 PHARM REV CODE 250

## 2025-02-17 PROCEDURE — 63600175 PHARM REV CODE 636 W HCPCS

## 2025-02-17 PROCEDURE — C1751 CATH, INF, PER/CENT/MIDLINE: HCPCS | Performed by: ANESTHESIOLOGY

## 2025-02-17 PROCEDURE — 82570 ASSAY OF URINE CREATININE: CPT

## 2025-02-17 PROCEDURE — 80053 COMPREHEN METABOLIC PANEL: CPT

## 2025-02-17 PROCEDURE — 62326 NJX INTERLAMINAR LMBR/SAC: CPT

## 2025-02-17 PROCEDURE — 86850 RBC ANTIBODY SCREEN: CPT

## 2025-02-17 PROCEDURE — 11000001 HC ACUTE MED/SURG PRIVATE ROOM

## 2025-02-17 RX ORDER — MAGNESIUM SULFATE HEPTAHYDRATE 40 MG/ML
2 INJECTION, SOLUTION INTRAVENOUS CONTINUOUS
Status: DISPENSED | OUTPATIENT
Start: 2025-02-17 | End: 2025-02-19

## 2025-02-17 RX ORDER — SIMETHICONE 80 MG
1 TABLET,CHEWABLE ORAL 4 TIMES DAILY PRN
Status: DISCONTINUED | OUTPATIENT
Start: 2025-02-17 | End: 2025-02-18

## 2025-02-17 RX ORDER — FENTANYL/BUPIVACAINE/NS/PF 2MCG/ML-.1
PLASTIC BAG, INJECTION (ML) INJECTION
Status: COMPLETED
Start: 2025-02-17 | End: 2025-02-17

## 2025-02-17 RX ORDER — SODIUM CHLORIDE, SODIUM LACTATE, POTASSIUM CHLORIDE, CALCIUM CHLORIDE 600; 310; 30; 20 MG/100ML; MG/100ML; MG/100ML; MG/100ML
INJECTION, SOLUTION INTRAVENOUS CONTINUOUS
Status: DISCONTINUED | OUTPATIENT
Start: 2025-02-17 | End: 2025-02-18

## 2025-02-17 RX ORDER — CALCIUM CARBONATE 200(500)MG
500 TABLET,CHEWABLE ORAL 3 TIMES DAILY PRN
Status: DISCONTINUED | OUTPATIENT
Start: 2025-02-17 | End: 2025-02-18

## 2025-02-17 RX ORDER — LABETALOL HYDROCHLORIDE 5 MG/ML
20 INJECTION, SOLUTION INTRAVENOUS ONCE
Status: COMPLETED | OUTPATIENT
Start: 2025-02-17 | End: 2025-02-17

## 2025-02-17 RX ORDER — LABETALOL HYDROCHLORIDE 5 MG/ML
40 INJECTION, SOLUTION INTRAVENOUS ONCE
Status: COMPLETED | OUTPATIENT
Start: 2025-02-17 | End: 2025-02-17

## 2025-02-17 RX ORDER — MAGNESIUM SULFATE HEPTAHYDRATE 40 MG/ML
4 INJECTION, SOLUTION INTRAVENOUS ONCE
Status: COMPLETED | OUTPATIENT
Start: 2025-02-17 | End: 2025-02-17

## 2025-02-17 RX ORDER — LIDOCAINE HYDROCHLORIDE AND EPINEPHRINE 15; 5 MG/ML; UG/ML
INJECTION, SOLUTION EPIDURAL
Status: DISCONTINUED | OUTPATIENT
Start: 2025-02-17 | End: 2025-02-21

## 2025-02-17 RX ORDER — OXYTOCIN-SODIUM CHLORIDE 0.9% IV SOLN 30 UNIT/500ML 30-0.9/5 UT/ML-%
0-32 SOLUTION INTRAVENOUS CONTINUOUS
Status: DISCONTINUED | OUTPATIENT
Start: 2025-02-17 | End: 2025-02-18

## 2025-02-17 RX ORDER — CALCIUM GLUCONATE 98 MG/ML
1 INJECTION, SOLUTION INTRAVENOUS
Status: DISCONTINUED | OUTPATIENT
Start: 2025-02-17 | End: 2025-02-21 | Stop reason: HOSPADM

## 2025-02-17 RX ORDER — SODIUM CHLORIDE 9 MG/ML
INJECTION, SOLUTION INTRAVENOUS
Status: DISCONTINUED | OUTPATIENT
Start: 2025-02-17 | End: 2025-02-18

## 2025-02-17 RX ORDER — ONDANSETRON 8 MG/1
8 TABLET, ORALLY DISINTEGRATING ORAL EVERY 8 HOURS PRN
Status: DISCONTINUED | OUTPATIENT
Start: 2025-02-17 | End: 2025-02-18

## 2025-02-17 RX ORDER — FENTANYL/BUPIVACAINE/NS/PF 2MCG/ML-.1
PLASTIC BAG, INJECTION (ML) INJECTION CONTINUOUS PRN
Status: DISCONTINUED | OUTPATIENT
Start: 2025-02-17 | End: 2025-02-21

## 2025-02-17 RX ADMIN — LABETALOL HYDROCHLORIDE 40 MG: 5 INJECTION INTRAVENOUS at 05:02

## 2025-02-17 RX ADMIN — LABETALOL HYDROCHLORIDE 20 MG: 5 INJECTION INTRAVENOUS at 06:02

## 2025-02-17 RX ADMIN — MAGNESIUM SULFATE HEPTAHYDRATE 4 G: 40 INJECTION, SOLUTION INTRAVENOUS at 05:02

## 2025-02-17 RX ADMIN — FENTANYL CITRATE 8 ML/HR: 50 INJECTION INTRAMUSCULAR; INTRAVENOUS at 06:02

## 2025-02-17 RX ADMIN — ONDANSETRON 8 MG: 8 TABLET, ORALLY DISINTEGRATING ORAL at 10:02

## 2025-02-17 RX ADMIN — LABETALOL HYDROCHLORIDE 20 MG: 5 INJECTION INTRAVENOUS at 05:02

## 2025-02-17 RX ADMIN — MAGNESIUM SULFATE HEPTAHYDRATE 2 G/HR: 40 INJECTION, SOLUTION INTRAVENOUS at 05:02

## 2025-02-17 RX ADMIN — LIDOCAINE HYDROCHLORIDE,EPINEPHRINE BITARTRATE 3 ML: 15; .005 INJECTION, SOLUTION EPIDURAL; INFILTRATION; INTRACAUDAL; PERINEURAL at 06:02

## 2025-02-17 RX ADMIN — SODIUM CHLORIDE, POTASSIUM CHLORIDE, SODIUM LACTATE AND CALCIUM CHLORIDE 1000 ML: 600; 310; 30; 20 INJECTION, SOLUTION INTRAVENOUS at 06:02

## 2025-02-17 RX ADMIN — Medication 4 MILLI-UNITS/MIN: at 06:02

## 2025-02-17 NOTE — ANESTHESIA PREPROCEDURE EVALUATION
"[facility]  Anesthesia Pre-Operative Evaluation         Patient Name: Josie Rouse  YOB: 1990  MRN: [id number]    2025      Josie Rouse is a 34 y.o. female  @ 37w2d who presents for IOL. C/b gHTN, anxiety, obesity. No hx of bleeding disorders, clotting disorders, cardiac problems, or back problems. Hx of sports induced asthma 12 years ago, has not had to use an inhaler at any point.     OB History    Para Term  AB Living   2    1 0   SAB IAB Ectopic Multiple Live Births    0         # Outcome Date GA Lbr Saurabh/2nd Weight Sex Type Anes PTL Lv   2 Current            1 AB                Review of patient's allergies indicates:  No Known Allergies    Wt Readings from Last 1 Encounters:   25 1639 89.3 kg (196 lb 13.9 oz)       BP Readings from Last 3 Encounters:   25 (!) 142/98   25 (!) 132/92   25 110/78       Problem List[1]    Past Surgical History:   Procedure Laterality Date    APPENDECTOMY         Social History[2]      Chemistry        Component Value Date/Time     (L) 2025 1634    K 4.5 2025 1634     2025 1634    CO2 19 (L) 2025 1634    BUN 12 2025 1634    CREATININE 0.7 2025 1634    GLU 80 2025 1634        Component Value Date/Time    CALCIUM 9.6 2025 1634    ALKPHOS 190 (H) 2025 1634    AST 14 2025 1634    ALT 10 2025 1634    BILITOT 0.2 2025 1634    ESTGFRAFRICA >60.0 2022 0948    EGFRNONAA >60.0 2022 0948            Lab Results   Component Value Date    WBC 12.71 (H) 2025    HGB 11.1 (L) 2025    HCT 33.8 (L) 2025    MCV 85 2025     2025       No results for input(s): "PT", "INR", "PROTIME", "APTT" in the last 72 hours.          Pre-op Assessment    I have reviewed the Patient Summary Reports.     I have reviewed the Nursing Notes. I have reviewed the NPO Status.   I have reviewed the " Medications.     Review of Systems  Anesthesia Hx:   Neg history of prior surgery.          Denies Family Hx of Anesthesia complications.    Denies Personal Hx of Anesthesia complications.                    Social:  Non-Smoker       Hematology/Oncology:       -- Denies Anemia:                                  Cardiovascular:      Denies Hypertension.   Denies MI.     Denies CABG/stent.     Denies CHF.       ECG has been reviewed.                            Pulmonary:    Denies COPD.  Denies Asthma.                    Renal/:   Denies Chronic Renal Disease.                Hepatic/GI:      Denies GERD. Denies Liver Disease.               Musculoskeletal:         Denies Spine Disorders             Neurological:    Denies CVA.    Denies Seizures.                                Endocrine:  Denies Diabetes. Denies Hypothyroidism.              Physical Exam  General: Well nourished, Cooperative, Alert and Oriented    Airway:  Mallampati: II   Tongue: Normal    Dental:  Periodontal disease        Anesthesia Plan  Type of Anesthesia, risks & benefits discussed:    Anesthesia Type: Gen ETT, Spinal, Epidural, CSE  Intra-op Monitoring Plan: Standard ASA Monitors  Post Op Pain Control Plan: multimodal analgesia and IV/PO Opioids PRN  Induction:  IV  Airway Plan: Direct and Video  Informed Consent: Informed consent signed with the Patient and all parties understand the risks and agree with anesthesia plan.  All questions answered. Patient consented to blood products? Yes  ASA Score: 2  Day of Surgery Review of History & Physical: H&P Update referred to the surgeon/provider.    Ready For Surgery From Anesthesia Perspective.     .           [1]   Patient Active Problem List  Diagnosis    Panic attack    Encounter for induction of labor    Asthma    Gestational hypertension, third trimester    Class 1 obesity due to excess calories without serious comorbidity with body mass index (BMI) of 30.0 to 30.9 in adult    Severe obesity  due to excess calories affecting pregnancy in third trimester   [2]   Social History  Socioeconomic History    Marital status:    Tobacco Use    Smoking status: Former     Current packs/day: 0.00     Average packs/day: 0.3 packs/day for 0.5 years (0.1 ttl pk-yrs)     Types: Cigarettes     Start date: 2019     Quit date:      Years since quittin.1     Passive exposure: Past    Smokeless tobacco: Never   Substance and Sexual Activity    Alcohol use: Not Currently     Alcohol/week: 2.0 standard drinks of alcohol     Types: 2 Glasses of wine per week     Comment: socially     Drug use: No    Sexual activity: Yes     Partners: Male

## 2025-02-17 NOTE — H&P
HISTORY AND PHYSICAL                                                OBSTETRICS          Subjective:       Josie Rouse is a 34 y.o.  female with IUP at 37w2d weeks gestation who presents for IOL. Patient denies any HA, vision changes, CP, SOB, or RUQ pain at this time.     Patient with sustained SR blood pressures requiring labetalol 20/40. Magnesium sulfate was initiated.    Patient denies contractions, denies vaginal bleeding, denies LOF.   Fetal Movement: normal.     This IUP is complicated by Anxiety, PreE w SF (BP), Obesity (ppBMI 30).    Review of Systems   Constitutional:  Negative for chills and fever.   Eyes:  Negative for visual disturbance.   Respiratory:  Negative for cough and shortness of breath.    Cardiovascular:  Negative for chest pain and palpitations.   Gastrointestinal:  Negative for abdominal pain, constipation, diarrhea, nausea and vomiting.   Genitourinary:  Negative for dysuria, hematuria, vaginal bleeding and vaginal discharge.   Musculoskeletal:  Negative for back pain.   Neurological:  Negative for headaches.       PMHx:   Past Medical History:   Diagnosis Date    Asthma        PSHx:   Past Surgical History:   Procedure Laterality Date    APPENDECTOMY         All: Review of patient's allergies indicates:  No Known Allergies    Meds: Prescriptions Prior to Admission[1]    SH: Social History[2]    FH:   Family History   Problem Relation Name Age of Onset    Breast cancer Neg Hx      Colon cancer Neg Hx      Ovarian cancer Neg Hx         OBHx:   OB History    Para Term  AB Living   2 0 0 0 1 0   SAB IAB Ectopic Multiple Live Births   0 0 0 0 0      # Outcome Date GA Lbr Saurabh/2nd Weight Sex Type Anes PTL Lv   2 Current            1 AB                Objective:       BP (!) 163/88 Comment: Dr. Renner notified. Will give 20mg labetalol IV now  Pulse 92   Temp 98.8 °F (37.1 °C) (Oral)   Resp 18   Ht 5' (1.524 m)   Wt 89.3 kg (196 lb 13.9 oz)   LMP 2024   SpO2  98%   Breastfeeding No   BMI 38.45 kg/m²     Vitals:    02/17/25 1656 02/17/25 1659 02/17/25 1700 02/17/25 1701   BP:    (!) 163/88   Pulse: 94 94 93 92   Resp:       Temp:       TempSrc:       SpO2:  98%     Weight:       Height:           General: NAD, alert, oriented, cooperative  HEENT: NCAT, EOM grossly intact  Lungs: Normal WOB  Heart: regular rate  Abdomen: gravid, soft, nondistended, nontender, no rebound or guarding  Extremities: edema    FHT: 140 bpm, moderate variability, +accels, -decels; Cat 1 (reassuring)  [place]: without signs of contractions  Presentation: cephalic by ultrasound    Cervix: 1/60/-4    EFW by Leopold's: 7#    Maternal pelvis unproven    Lab Review  Blood Type A POS  GBBS: negative  Rubella: Immune  Trep: previously negative, ordered on admit  HIV: negative  HepB: negative       Assessment:       37w2d weeks gestation with Obesity, gHTN, Anxiety    Active Hospital Problems    Diagnosis  POA    *Encounter for induction of labor [Z34.90]  Not Applicable    Gestational hypertension, third trimester [O13.3]  Yes    Class 1 obesity due to excess calories without serious comorbidity with body mass index (BMI) of 30.0 to 30.9 in adult [E66.811, E66.09, Z68.30]  Not Applicable    Severe obesity due to excess calories affecting pregnancy in third trimester [O99.213, E66.01]  Yes    Severe pre-eclampsia in third trimester [O14.13]  Yes      Resolved Hospital Problems   No resolved problems to display.        Plan:     IOL   Risks, benefits, alternatives and possible complications have been discussed in detail with the patient.   - Consents signed and to chart  - Admit to Labor and Delivery unit  - Epidural per Anesthesia  - Draw CBC, T&S  - Notify Staff  - Recheck in 2-4 hrs or PRN  - Post-Partum Hemorrhage risk - high (Mag, obesity), 2u pRBC held   - Postpartum lovenox: Not indicated  - Contraception: per primary OB  - Plan for [place] once epidural and pitocin    2. PreE w/SF  - BP as above  -  Patient with sustained SR blood pressures upon arrival requiring labetalol 20/40  - asymptomatic  - preE labs pending on admit  - Mag: will initiate at this time     3. Mood Disorder (H/o Panic attacks)   - Mood stable  - Medications: not on any home medications  - Will need 1-2 week postpartum mood check     4. Obesity  - ppBMI: 30  - Postpartum lovenox not indicated at this time    5. Asthma   - Hx of sports induced asthma 12 years ago, has not had to use an inhaler at any point.      Juana Peralta MD  PGY 4  Obstetrics and Gynecology          [1]   Medications Prior to Admission   Medication Sig Dispense Refill Last Dose/Taking    aspirin (ECOTRIN) 81 MG EC tablet Take 1 tablet (81 mg total) by mouth once daily.       prenatal vit/iron fum/folic ac (PRENATAL 1+1 ORAL) Take by mouth.      [2]   Social History  Socioeconomic History    Marital status:    Tobacco Use    Smoking status: Former     Current packs/day: 0.00     Average packs/day: 0.3 packs/day for 0.5 years (0.1 ttl pk-yrs)     Types: Cigarettes     Start date: 2019     Quit date:      Years since quittin.1     Passive exposure: Past    Smokeless tobacco: Never   Substance and Sexual Activity    Alcohol use: Not Currently     Alcohol/week: 2.0 standard drinks of alcohol     Types: 2 Glasses of wine per week     Comment: socially     Drug use: No    Sexual activity: Yes     Partners: Male     Social Drivers of Health     Financial Resource Strain: Low Risk  (2025)    Overall Financial Resource Strain (CARDIA)     Difficulty of Paying Living Expenses: Not hard at all   Food Insecurity: No Food Insecurity (2025)    Hunger Vital Sign     Worried About Running Out of Food in the Last Year: Never true     Ran Out of Food in the Last Year: Never true   Stress: No Stress Concern Present (2025)    [employer] - Occupational Stress Questionnaire     Feeling of Stress : Not at all   Housing Stability: Low  Risk  (2/17/2025)    Housing Stability Vital Sign     Unable to Pay for Housing in the Last Year: No     Homeless in the Last Year: No

## 2025-02-18 PROBLEM — Z34.90 ENCOUNTER FOR INDUCTION OF LABOR: Status: RESOLVED | Noted: 2025-02-17 | Resolved: 2025-02-18

## 2025-02-18 PROCEDURE — 0KQM0ZZ REPAIR PERINEUM MUSCLE, OPEN APPROACH: ICD-10-PCS | Performed by: OBSTETRICS & GYNECOLOGY

## 2025-02-18 PROCEDURE — 25000003 PHARM REV CODE 250

## 2025-02-18 PROCEDURE — 63600175 PHARM REV CODE 636 W HCPCS

## 2025-02-18 PROCEDURE — 10907ZC DRAINAGE OF AMNIOTIC FLUID, THERAPEUTIC FROM PRODUCTS OF CONCEPTION, VIA NATURAL OR ARTIFICIAL OPENING: ICD-10-PCS | Performed by: OBSTETRICS & GYNECOLOGY

## 2025-02-18 PROCEDURE — 11000001 HC ACUTE MED/SURG PRIVATE ROOM

## 2025-02-18 PROCEDURE — 63600175 PHARM REV CODE 636 W HCPCS: Performed by: STUDENT IN AN ORGANIZED HEALTH CARE EDUCATION/TRAINING PROGRAM

## 2025-02-18 PROCEDURE — 51701 INSERT BLADDER CATHETER: CPT

## 2025-02-18 PROCEDURE — 59400 OBSTETRICAL CARE: CPT | Mod: ,,, | Performed by: OBSTETRICS & GYNECOLOGY

## 2025-02-18 PROCEDURE — 86920 COMPATIBILITY TEST SPIN: CPT

## 2025-02-18 RX ORDER — DIPHENHYDRAMINE HYDROCHLORIDE 50 MG/ML
25 INJECTION INTRAMUSCULAR; INTRAVENOUS EVERY 4 HOURS PRN
Status: DISCONTINUED | OUTPATIENT
Start: 2025-02-18 | End: 2025-02-21 | Stop reason: HOSPADM

## 2025-02-18 RX ORDER — METHYLERGONOVINE MALEATE 0.2 MG/ML
200 INJECTION INTRAVENOUS ONCE AS NEEDED
Status: DISCONTINUED | OUTPATIENT
Start: 2025-02-18 | End: 2025-02-21 | Stop reason: HOSPADM

## 2025-02-18 RX ORDER — PRENATAL WITH FERROUS FUM AND FOLIC ACID 3080; 920; 120; 400; 22; 1.84; 3; 20; 10; 1; 12; 200; 27; 25; 2 [IU]/1; [IU]/1; MG/1; [IU]/1; MG/1; MG/1; MG/1; MG/1; MG/1; MG/1; UG/1; MG/1; MG/1; MG/1; MG/1
1 TABLET ORAL DAILY
Status: DISCONTINUED | OUTPATIENT
Start: 2025-02-18 | End: 2025-02-21 | Stop reason: HOSPADM

## 2025-02-18 RX ORDER — METHYLERGONOVINE MALEATE 0.2 MG/ML
200 INJECTION INTRAVENOUS ONCE AS NEEDED
Status: DISCONTINUED | OUTPATIENT
Start: 2025-02-18 | End: 2025-02-18

## 2025-02-18 RX ORDER — MISOPROSTOL 200 UG/1
800 TABLET ORAL ONCE AS NEEDED
Status: DISCONTINUED | OUTPATIENT
Start: 2025-02-18 | End: 2025-02-21 | Stop reason: HOSPADM

## 2025-02-18 RX ORDER — HYDROCODONE BITARTRATE AND ACETAMINOPHEN 500; 5 MG/1; MG/1
TABLET ORAL
Status: DISCONTINUED | OUTPATIENT
Start: 2025-02-18 | End: 2025-02-18

## 2025-02-18 RX ORDER — OXYTOCIN-SODIUM CHLORIDE 0.9% IV SOLN 30 UNIT/500ML 30-0.9/5 UT/ML-%
95 SOLUTION INTRAVENOUS CONTINUOUS PRN
Status: DISCONTINUED | OUTPATIENT
Start: 2025-02-18 | End: 2025-02-21 | Stop reason: HOSPADM

## 2025-02-18 RX ORDER — FENTANYL CITRATE 50 UG/ML
INJECTION, SOLUTION INTRAMUSCULAR; INTRAVENOUS
Status: COMPLETED
Start: 2025-02-18 | End: 2025-02-18

## 2025-02-18 RX ORDER — SODIUM CITRATE AND CITRIC ACID MONOHYDRATE 334; 500 MG/5ML; MG/5ML
30 SOLUTION ORAL ONCE
Status: DISCONTINUED | OUTPATIENT
Start: 2025-02-18 | End: 2025-02-18

## 2025-02-18 RX ORDER — IBUPROFEN 600 MG/1
600 TABLET ORAL EVERY 6 HOURS
Status: DISCONTINUED | OUTPATIENT
Start: 2025-02-18 | End: 2025-02-21 | Stop reason: HOSPADM

## 2025-02-18 RX ORDER — DIPHENHYDRAMINE HCL 25 MG
25 CAPSULE ORAL EVERY 4 HOURS PRN
Status: DISCONTINUED | OUTPATIENT
Start: 2025-02-18 | End: 2025-02-21 | Stop reason: HOSPADM

## 2025-02-18 RX ORDER — LIDOCAINE HYDROCHLORIDE 10 MG/ML
10 INJECTION, SOLUTION INFILTRATION; PERINEURAL ONCE AS NEEDED
Status: DISCONTINUED | OUTPATIENT
Start: 2025-02-18 | End: 2025-02-18

## 2025-02-18 RX ORDER — DIPHENOXYLATE HYDROCHLORIDE AND ATROPINE SULFATE 2.5; .025 MG/1; MG/1
2 TABLET ORAL EVERY 6 HOURS PRN
Status: DISCONTINUED | OUTPATIENT
Start: 2025-02-18 | End: 2025-02-21 | Stop reason: HOSPADM

## 2025-02-18 RX ORDER — HYDROCORTISONE 25 MG/G
CREAM TOPICAL 3 TIMES DAILY PRN
Status: DISCONTINUED | OUTPATIENT
Start: 2025-02-18 | End: 2025-02-21 | Stop reason: HOSPADM

## 2025-02-18 RX ORDER — NIFEDIPINE 30 MG/1
30 TABLET, EXTENDED RELEASE ORAL DAILY
Status: DISCONTINUED | OUTPATIENT
Start: 2025-02-18 | End: 2025-02-19

## 2025-02-18 RX ORDER — SODIUM CHLORIDE 0.9 % (FLUSH) 0.9 %
10 SYRINGE (ML) INJECTION
Status: DISCONTINUED | OUTPATIENT
Start: 2025-02-18 | End: 2025-02-21 | Stop reason: HOSPADM

## 2025-02-18 RX ORDER — MISOPROSTOL 200 UG/1
200 TABLET ORAL EVERY 6 HOURS
Status: COMPLETED | OUTPATIENT
Start: 2025-02-18 | End: 2025-02-19

## 2025-02-18 RX ORDER — ONDANSETRON 8 MG/1
8 TABLET, ORALLY DISINTEGRATING ORAL EVERY 8 HOURS PRN
Status: DISCONTINUED | OUTPATIENT
Start: 2025-02-18 | End: 2025-02-21 | Stop reason: HOSPADM

## 2025-02-18 RX ORDER — CARBOPROST TROMETHAMINE 250 UG/ML
250 INJECTION, SOLUTION INTRAMUSCULAR
Status: DISCONTINUED | OUTPATIENT
Start: 2025-02-18 | End: 2025-02-21 | Stop reason: HOSPADM

## 2025-02-18 RX ORDER — TALC
6 POWDER (GRAM) TOPICAL NIGHTLY PRN
Status: DISCONTINUED | OUTPATIENT
Start: 2025-02-18 | End: 2025-02-18

## 2025-02-18 RX ORDER — DOCUSATE SODIUM 100 MG/1
200 CAPSULE, LIQUID FILLED ORAL 2 TIMES DAILY PRN
Status: DISCONTINUED | OUTPATIENT
Start: 2025-02-18 | End: 2025-02-21

## 2025-02-18 RX ORDER — OXYTOCIN 10 [USP'U]/ML
10 INJECTION, SOLUTION INTRAMUSCULAR; INTRAVENOUS ONCE AS NEEDED
Status: DISCONTINUED | OUTPATIENT
Start: 2025-02-18 | End: 2025-02-18

## 2025-02-18 RX ORDER — OXYTOCIN 10 [USP'U]/ML
10 INJECTION, SOLUTION INTRAMUSCULAR; INTRAVENOUS ONCE AS NEEDED
Status: DISCONTINUED | OUTPATIENT
Start: 2025-02-18 | End: 2025-02-21 | Stop reason: HOSPADM

## 2025-02-18 RX ORDER — OXYTOCIN-SODIUM CHLORIDE 0.9% IV SOLN 30 UNIT/500ML 30-0.9/5 UT/ML-%
30 SOLUTION INTRAVENOUS ONCE AS NEEDED
Status: DISCONTINUED | OUTPATIENT
Start: 2025-02-18 | End: 2025-02-18

## 2025-02-18 RX ORDER — OXYTOCIN-SODIUM CHLORIDE 0.9% IV SOLN 30 UNIT/500ML 30-0.9/5 UT/ML-%
95 SOLUTION INTRAVENOUS ONCE AS NEEDED
Status: DISCONTINUED | OUTPATIENT
Start: 2025-02-18 | End: 2025-02-21 | Stop reason: HOSPADM

## 2025-02-18 RX ORDER — MISOPROSTOL 200 UG/1
800 TABLET ORAL ONCE AS NEEDED
Status: COMPLETED | OUTPATIENT
Start: 2025-02-18 | End: 2025-02-18

## 2025-02-18 RX ORDER — BUPIVACAINE HYDROCHLORIDE 2.5 MG/ML
INJECTION, SOLUTION EPIDURAL; INFILTRATION; INTRACAUDAL
Status: COMPLETED
Start: 2025-02-18 | End: 2025-02-18

## 2025-02-18 RX ORDER — CARBOPROST TROMETHAMINE 250 UG/ML
250 INJECTION, SOLUTION INTRAMUSCULAR
Status: DISCONTINUED | OUTPATIENT
Start: 2025-02-18 | End: 2025-02-18

## 2025-02-18 RX ORDER — ACETAMINOPHEN 500 MG
1000 TABLET ORAL ONCE
Status: COMPLETED | OUTPATIENT
Start: 2025-02-18 | End: 2025-02-18

## 2025-02-18 RX ORDER — OXYCODONE HYDROCHLORIDE 5 MG/1
5 TABLET ORAL EVERY 6 HOURS PRN
Status: DISCONTINUED | OUTPATIENT
Start: 2025-02-18 | End: 2025-02-21 | Stop reason: HOSPADM

## 2025-02-18 RX ORDER — OXYTOCIN-SODIUM CHLORIDE 0.9% IV SOLN 30 UNIT/500ML 30-0.9/5 UT/ML-%
10 SOLUTION INTRAVENOUS ONCE AS NEEDED
Status: COMPLETED | OUTPATIENT
Start: 2025-02-18 | End: 2025-02-18

## 2025-02-18 RX ORDER — OXYTOCIN-SODIUM CHLORIDE 0.9% IV SOLN 30 UNIT/500ML 30-0.9/5 UT/ML-%
10 SOLUTION INTRAVENOUS ONCE AS NEEDED
Status: DISCONTINUED | OUTPATIENT
Start: 2025-02-18 | End: 2025-02-21 | Stop reason: HOSPADM

## 2025-02-18 RX ORDER — BUPIVACAINE HYDROCHLORIDE 2.5 MG/ML
INJECTION, SOLUTION EPIDURAL; INFILTRATION; INTRACAUDAL
Status: DISCONTINUED | OUTPATIENT
Start: 2025-02-18 | End: 2025-02-21

## 2025-02-18 RX ORDER — FENTANYL/BUPIVACAINE/NS/PF 2MCG/ML-.1
PLASTIC BAG, INJECTION (ML) INJECTION CONTINUOUS
Status: DISCONTINUED | OUTPATIENT
Start: 2025-02-18 | End: 2025-02-18

## 2025-02-18 RX ORDER — TRANEXAMIC ACID 10 MG/ML
1000 INJECTION, SOLUTION INTRAVENOUS EVERY 30 MIN PRN
Status: DISCONTINUED | OUTPATIENT
Start: 2025-02-18 | End: 2025-02-21 | Stop reason: HOSPADM

## 2025-02-18 RX ORDER — FAMOTIDINE 10 MG/ML
20 INJECTION INTRAVENOUS ONCE
Status: DISCONTINUED | OUTPATIENT
Start: 2025-02-18 | End: 2025-02-18

## 2025-02-18 RX ORDER — MISOPROSTOL 200 UG/1
800 TABLET ORAL ONCE AS NEEDED
Status: DISCONTINUED | OUTPATIENT
Start: 2025-02-18 | End: 2025-02-18

## 2025-02-18 RX ORDER — ACETAMINOPHEN 325 MG/1
650 TABLET ORAL EVERY 6 HOURS SCHEDULED
Status: DISCONTINUED | OUTPATIENT
Start: 2025-02-18 | End: 2025-02-21 | Stop reason: HOSPADM

## 2025-02-18 RX ORDER — SIMETHICONE 80 MG
1 TABLET,CHEWABLE ORAL EVERY 6 HOURS PRN
Status: DISCONTINUED | OUTPATIENT
Start: 2025-02-18 | End: 2025-02-21 | Stop reason: HOSPADM

## 2025-02-18 RX ORDER — LABETALOL HYDROCHLORIDE 5 MG/ML
20 INJECTION, SOLUTION INTRAVENOUS ONCE
Status: COMPLETED | OUTPATIENT
Start: 2025-02-18 | End: 2025-02-18

## 2025-02-18 RX ORDER — FENTANYL CITRATE 50 UG/ML
INJECTION, SOLUTION INTRAMUSCULAR; INTRAVENOUS
Status: DISCONTINUED | OUTPATIENT
Start: 2025-02-18 | End: 2025-02-21

## 2025-02-18 RX ORDER — OXYTOCIN-SODIUM CHLORIDE 0.9% IV SOLN 30 UNIT/500ML 30-0.9/5 UT/ML-%
95 SOLUTION INTRAVENOUS ONCE AS NEEDED
Status: DISCONTINUED | OUTPATIENT
Start: 2025-02-18 | End: 2025-02-18

## 2025-02-18 RX ORDER — OXYTOCIN-SODIUM CHLORIDE 0.9% IV SOLN 30 UNIT/500ML 30-0.9/5 UT/ML-%
95 SOLUTION INTRAVENOUS CONTINUOUS PRN
Status: DISCONTINUED | OUTPATIENT
Start: 2025-02-18 | End: 2025-02-18

## 2025-02-18 RX ORDER — DIPHENOXYLATE HYDROCHLORIDE AND ATROPINE SULFATE 2.5; .025 MG/1; MG/1
2 TABLET ORAL EVERY 6 HOURS PRN
Status: DISCONTINUED | OUTPATIENT
Start: 2025-02-18 | End: 2025-02-18

## 2025-02-18 RX ORDER — TRANEXAMIC ACID 10 MG/ML
1000 INJECTION, SOLUTION INTRAVENOUS EVERY 30 MIN PRN
Status: DISCONTINUED | OUTPATIENT
Start: 2025-02-18 | End: 2025-02-18

## 2025-02-18 RX ADMIN — IBUPROFEN 600 MG: 600 TABLET ORAL at 11:02

## 2025-02-18 RX ADMIN — OXYCODONE HYDROCHLORIDE 5 MG: 5 TABLET ORAL at 07:02

## 2025-02-18 RX ADMIN — BUPIVACAINE HYDROCHLORIDE 5 ML: 2.5 INJECTION, SOLUTION EPIDURAL; INFILTRATION; INTRACAUDAL at 09:02

## 2025-02-18 RX ADMIN — BUPIVACAINE HYDROCHLORIDE 6 ML: 2.5 INJECTION, SOLUTION EPIDURAL; INFILTRATION; INTRACAUDAL at 09:02

## 2025-02-18 RX ADMIN — OXYTOCIN-SODIUM CHLORIDE 0.9% IV SOLN 30 UNIT/500ML 10 UNITS: 30-0.9/5 SOLUTION at 11:02

## 2025-02-18 RX ADMIN — IBUPROFEN 600 MG: 600 TABLET ORAL at 06:02

## 2025-02-18 RX ADMIN — CALCIUM CARBONATE (ANTACID) CHEW TAB 500 MG 500 MG: 500 CHEW TAB at 12:02

## 2025-02-18 RX ADMIN — Medication 95 MILLI-UNITS/MIN: at 11:02

## 2025-02-18 RX ADMIN — DOCUSATE SODIUM 200 MG: 100 CAPSULE, LIQUID FILLED ORAL at 07:02

## 2025-02-18 RX ADMIN — ACETAMINOPHEN 650 MG: 325 TABLET, FILM COATED ORAL at 06:02

## 2025-02-18 RX ADMIN — MISOPROSTOL 200 MCG: 200 TABLET ORAL at 06:02

## 2025-02-18 RX ADMIN — LABETALOL HYDROCHLORIDE 20 MG: 5 INJECTION INTRAVENOUS at 01:02

## 2025-02-18 RX ADMIN — NIFEDIPINE 30 MG: 30 TABLET, FILM COATED, EXTENDED RELEASE ORAL at 03:02

## 2025-02-18 RX ADMIN — CARBOPROST TROMETHAMINE 250 MCG: 250 INJECTION, SOLUTION INTRAMUSCULAR at 12:02

## 2025-02-18 RX ADMIN — DIPHENOXYLATE HYDROCHLORIDE AND ATROPINE SULFATE 2 TABLET: 2.5; .025 TABLET ORAL at 12:02

## 2025-02-18 RX ADMIN — MISOPROSTOL 800 MCG: 200 TABLET ORAL at 11:02

## 2025-02-18 RX ADMIN — FENTANYL CITRATE 100 MCG: 50 INJECTION, SOLUTION INTRAMUSCULAR; INTRAVENOUS at 09:02

## 2025-02-18 RX ADMIN — ACETAMINOPHEN 1000 MG: 500 TABLET, FILM COATED ORAL at 04:02

## 2025-02-18 RX ADMIN — MELATONIN TAB 3 MG 6 MG: 3 TAB at 05:02

## 2025-02-18 RX ADMIN — Medication 95 MILLI-UNITS/MIN: at 12:02

## 2025-02-18 RX ADMIN — MAGNESIUM SULFATE HEPTAHYDRATE 2 G/HR: 40 INJECTION, SOLUTION INTRAVENOUS at 01:02

## 2025-02-18 NOTE — MEDICAL/APP STUDENT
POSTPARTUM PROGRESS NOTE    Subjective:     PPD/POD#: 1   Procedure:    EGA: 37w3d   N/V: No   F/C: No fever. She notes having the shakes overnight.    Abd Pain: Mild, well-controlled with oral pain medication   Lochia: Mild   Voiding: Yes   Ambulating: Yes   Bowel fnc: No; denies bowel movements and passing flatus.    Contraception: NFP/Condoms     Objective:     Temp:  [98 °F (36.7 °C)-98.9 °F (37.2 °C)] 98.6 °F (37 °C)  Pulse:  [] 85  Resp:  [16-18] 18  SpO2:  [90 %-99 %] 90 %  BP: (105-179)/() 115/59    Abdomen: Soft, appropriately tender   Uterus: Firm, no fundal tenderness   Incision: N/A     Lab Review    Recent Labs   Lab 25  1644      K 4.2      CO2 19*   BUN 12   CREATININE 0.7      PROT 6.7   BILITOT 0.2   ALKPHOS 187*   ALT 8*   AST 14       Recent Labs   Lab 25  1644 25  0322   WBC 12.44 18.14*   HGB 10.4* 8.1*   HCT 30.3* 23.3*   MCV 84 85    232         I/O    Intake/Output Summary (Last 24 hours) at 2025 0601  Last data filed at 2025 0400  Gross per 24 hour   Intake 4110.15 ml   Output 4422 ml   Net -311.85 ml          Assessment and Plan:   1.Postpartum care:  - Patient doing well.  - Continue routine management and advances.    2. PreE w/SF  - BP as above  - asymptomatic  - preE labs as above  - UOP: 2.2 cc/kg/hr  - Mag: continue  - Hypertensive agent: Labetalol 20, Procardia XL 30       3. Acute blood loss anemia  - H/H as above  - QBL: 530 ml  - asymptomatic  - iron/colace    4. 2nd degree lac  - Stable    5. Mood Disorder (H/o Panic attacks)   - Mood stable  - Medications: not on any home medications  - Will need 1-2 week postpartum mood check     6.Obesity  - ppBMI: 30  - Postpartum lovenox not indicated at this time     7. Asthma   - Hx of sports induced asthma 12 years ago, has not had to use an inhaler at any point.    Lucy Ramirez, MS3  [employer]

## 2025-02-18 NOTE — PLAN OF CARE
The patient is progressing through labor. Patient is s/p cervical ripening balloon, spontaneously ruptured membranes, and has a current SVE of 5/70/-2. Patient is resting comfortably with an epidural. Patient has had no critical blood pressures since started on magnesium drip.

## 2025-02-18 NOTE — PROGRESS NOTES
Pt unable to urinate. Rns in and out catheterized 350 mL at 1355.     Rns helped pt from bed into wheelchair. Pt denies dizziness/weakness. Pt reports feeling tired.    Pt wheeled to  by rn. Baby wheeled to  in bassinet due to mom's fatigue.     Report given to CHUCHO Cruz.

## 2025-02-18 NOTE — PROGRESS NOTES
LABOR NOTE    S:  Complaints: No.  Epidural Working:  yes    O: BP (!) 146/75   Pulse 82   Temp 98.2 °F (36.8 °C) (Oral)   Resp 16   Ht 5' (1.524 m)   Wt 89.3 kg (196 lb 13.9 oz)   LMP 06/01/2024   SpO2 97%   Breastfeeding No   BMI 38.45 kg/m²     FHT: 130 bpm, moderate variability, +accels, no decels Cat 1 (reassuring)  CTX: q 2-3 minutes, pit @ 12  SVE: 2/60/-3    TIMELINE:   1720: 1/60/-4, unable to place Wayne, patient desires epidural prior. Pitocin ordered  2000: 2/60/-3, Wayne balloon placed, pit at 12    PLAN:    IOL  Continue Close Maternal/Fetal Monitoring  Pitocin Augmentation per protocol  Recheck 2 hours or PRN    2. PreE w/SF  - Continue magnesium sulfate infusion  - Recent BP as above  - Asymptomatic  - s/p IV labetalol 20/40/20 (most recent push at 1836)  - PreE labs on admit: Plt 268, Cr 0.7, ALT 8, AST 14, P:C ratio 0.13    Betzaida Pedroza MD  OB/GYN PGY-1

## 2025-02-18 NOTE — PROGRESS NOTES
LABOR NOTE    S:  Complaints: No.  Epidural Working:  Yes    Resident to bedside for routine cervical exam    O: /76 (BP Location: Right arm)   Pulse 102   Temp 98.9 °F (37.2 °C) (Oral)   Resp 16   Ht 5' (1.524 m)   Wt 89.3 kg (196 lb 13.9 oz)   LMP 2024   SpO2 97%   Breastfeeding No   BMI 38.45 kg/m²     FHT: 120 bpm, moderate variability, +accels, rare variable decels; Cat 1 (reassuring)  CTX: difficult to discern on TOCO Pit @ 32, toco adjusted   SVE: 10/100/+1    TIMELINE:   1720: /-4, unable to place Wayne, patient desires epidural prior. Pitocin ordered  : /-3, Wayne balloon placed, pit at 12  0015: SROM clr  0045: /-2  0500: /-2, AROM forebag clear  0800: /-1, pit @ 32   1000: 10/100/+1; pit @ 32    PLAN:    IOL  - Continue Close Maternal/Fetal Monitoring  - Will set up and begin pushing shortly  - Anticipate   - Charge, staff, chief aware    2. PreE w/SF  - Continue magnesium sulfate infusion  - no s/s mag toxicity; 2+ brachial reflexes bilaterally. continue routine evals  - Recent BP as above  - Asymptomatic  - s/p IV labetalol 20/40/20 (most recent push at 1836)  - PreE labs on admit: Plt 268, Cr 0.7, ALT 8, AST 14, P:C ratio 0.13    Hilary Renner MD  Obstetrics and Gynecology - PGY2

## 2025-02-18 NOTE — PROGRESS NOTES
LABOR NOTE    S:  Complaints: No.  Epidural Working:  Yes    O: BP (!) 150/88   Pulse 103   Temp 98.2 °F (36.8 °C) (Oral)   Resp 17   Ht 5' (1.524 m)   Wt 89.3 kg (196 lb 13.9 oz)   LMP 06/01/2024   SpO2 99%   Breastfeeding No   BMI 38.45 kg/m²     FHT: 125 bpm, moderate variability, +accels, rare variable decels; Cat 1 (reassuring)  CTX: q2-3 min, Pit @ 32, toco adjusted   SVE: 7/80/-1     TIMELINE:   1720: 1/60/-4, unable to place Wayne, patient desires epidural prior. Pitocin ordered  2000: 2/60/-3, Wayne balloon placed, pit at 12  0015: SROM clr  0045: 5/70/-2  0500: 5/70/-2, AROM forebag clear  0800: 7/80/-1, pit @ 32     PLAN:    IOL  - Continue Close Maternal/Fetal Monitoring  - Pitocin Augmentation per protocol  - Recheck 2 hours or PRN    2. PreE w/SF  - Continue magnesium sulfate infusion  - no s/s mag toxicity; 2+ brachial reflexes bilaterally. continue routine evals  - Recent BP as above  - Asymptomatic  - s/p IV labetalol 20/40/20 (most recent push at 1836)  - PreE labs on admit: Plt 268, Cr 0.7, ALT 8, AST 14, P:C ratio 0.13      Hanna Drew MD (Mary)   Obstetrics and Gynecology, PGY1

## 2025-02-18 NOTE — PROGRESS NOTES
LABOR NOTE    S:  Complaints: No.  Epidural Working:  Yes    O: /64   Pulse 90   Temp 97.8 °F (36.6 °C) (Oral)   Resp 16   Ht 5' (1.524 m)   Wt 89.3 kg (196 lb 13.9 oz)   LMP 06/01/2024   SpO2 97%   Breastfeeding No   BMI 38.45 kg/m²     FHT: 125 bpm, moderate variability, +accels, no decels Cat 1 (reassuring)  CTX: q 2-3 minutes, pit @ 20mU/min  SVE: 5/70/-2    TIMELINE:   1720: 1/60/-4, unable to place Wayne, patient desires epidural prior. Pitocin ordered  2000: 2/60/-3, Wayne balloon placed, pit at 12  0015: SROM clr  0045: 5/70/-2, pit @ 20mU/min    PLAN:    IOL  - Continue Close Maternal/Fetal Monitoring  - Pitocin Augmentation per protocol  - Recheck 2 hours or PRN    2. PreE w/SF  - Continue magnesium sulfate infusion  - Recent BP as above  - Asymptomatic  - s/p IV labetalol 20/40/20 (most recent push at 1836)  - PreE labs on admit: Plt 268, Cr 0.7, ALT 8, AST 14, P:C ratio 0.13      Rainer Arriola MD PGY2  Obstetrics and Gynecology

## 2025-02-18 NOTE — DISCHARGE INSTRUCTIONS
Community Resources for Breastfeeding Mothers:   Hospital Breastfeeding Centers/ Lactation Consultants:   [facility]........................................................................................[phone]  Outpatient Lactation Consults               [phone]   [facility]....................................................[phone]   [facility]..........................................................................................[phone]   [facility].................................................................................[phone]   [facility].......................................................................................[phone]   [facility].................................................................[phone]   [facility].......................................................................[phone]   [facility]..................................................[phone]   [facility].....................................................................[phone]      [facility] (Poison Control)...........[phone]  PoisonHelp.org   Free medical advice 24/7 through the Poison Help Line and the online tool      Online Resources:   International Breastfeeding [place] ...............................................................................ibconline.ca   Dr Yakov Astorga online resource provides videos, articles, and information sheets.     Coeffective...............................................................................................................coeffective.com   Download the free mobile sidney to help get off to a great start with breastfeeding.   Droplet.....................................................................................................................Pin-Digital.com   Global  Health Media...........................................................................................BioMimetix Pharmaceutical.org   Videos that teach and empower mothers and caregivers   Infant [facility].............................................................................[phone]      [employer]   Provides up to date information for medication use by moms during pregnancy and while breastfeeding.   Jacqueline Mims....................................................................................................................kellymom.com   Provides online information on breastfeeding and parenting      La Leche League........................................................................................ lllalmsla.org   /   llli.org   Mother-to-mother support groups with education, information support, and encouragement    Work and Pump........................................................................................... GIS Cloud.com   Information about breastfeeding for working moms     [place] Resources:   [place] Breastfeeding Coalition............................... [phone]    [facility]   Find local breastfeeding support   [place] Breastfeeding Support............................................................ LaBreastfeedingSport.org   Zip code search of breastfeeding resources in your area   Partners for Healthy Babies............................................................[phone]   2534581auxw.org   Connects [place] moms and their families to health and pregnancy resources.  24/7   WIC (Women, Infant, Children)......................................................... [phone]   ldh.la.gov/WIC   Download the Xatori sidney, get code from WIC office    [facility] Resources:     Baby Cafe............................................................................................................. babycafeusa.org   Free, drop in, informal  breastfeeding support groups offering professional lactation care and intervention.    [facility] [place] [facility]....................................... birthmarkFireScope.com   Infant feeding drop in clinics, Lactation services, support groups, education programs   [facility]...............................................[phone]   Graphicly.com/groups/[facility]   Free breastfeeding support group for families of color   Mothers Milk Bank [facility]....................................................[phone]                                                             IDOMOTICSsSplash.Britely/services/[facility]s-milk-bank-[facility]   FLOR Nesting..................................................................................[phone] nolanesting.com   In person and virtual support for families through pregnancy, birth, and early parenthood.       [facility] [place]- Dr. Oliva Sparrow.......................[phone]   [address] [place] [address]                                  [email or web address]   [email or web address]   [employer] Lactation Care, [facility] (Yamila Carrero RN, IBCLC) ............................[email or web address]    Healthy Start [place].....................................[phone] ([place])  [phone] ([place])   [facility].gov/health-department/healthy-start   Serves women of childbearing age and addresses issues for pregnant women and their children from birth  to age two.          La Leche League- [place]............................. FleetMatics.Medigram/ Graphicly.Medigram/ VitalsGuardmohamud   In person and virtual mother to mother support groups with education, information support and   encouragement to women who want to breastfeed      [place] Resources:   Breastfeeding Resources- [facility] [place] [facility]  Health.....msdh.ms.gov (under womens services)   Find resources and info about planning for breastfeeding, its benefits, and help with breastfeeding  s uccessfully.    [facility]regnancy Choices- [place]....................................... [place]   [phone]   [address] [place]. Call or text 24/7   [facility].......................................................Involution Studiosastbreastfeedingcenter.[employer] Health Solutions   [facility] Lactation Consultants sere [facility], including [facility],   [facility], and surrounding areas.    [place] Breastfeeding Coalition...............................................................................msbfc.org   Promotes and supports breastfeeding with families, health providers, and communities.   [facility] breastfeeding Coalition.....................................................................smbfc.org   Find breastfeeding resources and support groups in your area.    WIC Nutrition Program- [facility] of Health.................................... [place].gov

## 2025-02-18 NOTE — L&D DELIVERY NOTE
"[facility] - Labor & Delivery  Vaginal Delivery   Operative Note    SUMMARY     Normal spontaneous vaginal delivery of live infant, was placed on mothers abdomen for skin to skin and bulb suctioning performed. Infant delivered position LANCE over intact perineum. Nuchal cord x2 : Yes, cord reduced at perineum. Spontaneous delivery of placenta and IV pitocin given noting uterine atony with bleeding. Patient received rectal cytotec. Prior to administering Hemabate patient reported she had a h/o childhood sports induced asthma and has never used an inhaler. Patient was counseled on R/B/A of medication in patients with H/o asthma, decision was made to administer IM Hemabate. Patient expressed understanding and agreed with plan. Patient tolerated interventions without any complications and excellent improvement in uterine tone was noted. 2nd degree laceration noted and repaired in normal fashion with 2-0 vicryl . Excellent hemostasis was noted. Patient tolerated delivery well. Sponge needle and lap counted correctly x2.  cc.    Indications:  (spontaneous vaginal delivery)  Pregnancy complicated by: Problem List[1]  Admitting GA: 37w3d    Delivery Information for Carl Rouse    Birth information:  YOB: 2025   Time of birth: 11:50 AM   Sex: female   Head Delivery Date/Time: 2025 11:50 AM   Delivery type: Vaginal, Spontaneous   Gestational Age: 37w3d       Delivery Providers    Delivering clinician: Ansley Peterson MD   Provider Role    Kristie Hernández MD 1st Call Resident    Yasmine Talavera RN Registered Nurse    Trina, Temi, RN Registered Nurse              Measurements    Weight: 2860 g  Weight (lbs): 6 lb 4.9 oz  Length: 50.2 cm  Length (in): 19.75"  Head circumference: 33.7 cm  Chest circumference: 31.1 cm         Apgars    Living status: Living  Apgar Component Scores:  1 min.:  5 min.:  10 min.:  15 min.:  20 min.:    Skin color:  1  1       Heart rate:  2  2       Reflex " irritability:  2  2       Muscle tone:  2  2       Respiratory effort:  2  2       Total:  9  9       Apgars assigned by: NICU         Operative Delivery    Forceps attempted?: No  Vacuum extractor attempted?: No         Shoulder Dystocia    Shoulder dystocia present?: No           Presentation    Presentation: Vertex  Position: Left Occiput Anterior           Interventions/Resuscitation    Method: Bulb Suctioning, Tactile Stimulation       Cord    Vessels: 3 vessels  Complications: Nuchal  Nuchal Intervention: reduced  Nuchal Cord Description: loose nuchal cord  Number of Loops: 2  Delayed Cord Clamping?: Yes  Cord Clamped Date/Time: 2025 11:51 AM  Cord Blood Disposition: Discarded  Gases Sent?: No  Stem Cell Collection (by MD): No       Placenta    Placenta delivery date/time: 2025 11:55  Placenta removal: Expressed  Placenta appearance: Intact  Placenta disposition: Donation           Labor Events:       labor: No     Labor Onset Date/Time:         Dilation Complete Date/Time: 2025 10:02     Start Pushing Date/Time: 2025 10:56       Start Pushing Date/Time: 2025 10:56     Rupture Date/Time: 25 0015        Rupture type: SRM (Spontaneous Rupture)        Fluid Amount:       Fluid Color: Clear              steroids: None     Antibiotics given for GBS: No     Induction: balloon dilation (Wayne)     Indications for induction:  Hypertension     Augmentation: oxytocin;amniotomy     Indications for augmentation: Ineffective Contraction Pattern     Labor complications: None     Additional complications:          Cervical ripening:                     Delivery:      Episiotomy: None     Indication for Episiotomy:       Perineal Lacerations:   Repaired:      Periurethral Laceration:   Repaired:     Labial Laceration:   Repaired:     Sulcus Laceration:   Repaired:     Vaginal Laceration:   Repaired:     Cervical Laceration:   Repaired:     Repair suture:       Repair # of  packets:       Last Value - EBL - Nursing (mL):       Sum - EBL - Nursing (mL): 0     Last Value - EBL - Anesthesia (mL):      Calculated QBL (mL): 532     Running total QBL (mL): 532     Vaginal Sweep Performed:       Surgicount Correct: Yes     Vaginal Packing:   Quantity:       Other providers:       Anesthesia    Method: Epidural          Details (if applicable):  Trial of Labor      Categorization:      Priority:     Indications for :     Incision Type:       Additional  information:  Forceps:    Vacuum:    Breech:    Observed anomalies    Other (Comments):       Kristie Hernández MD  [facility]   OBGYN PGY-1         [1]   Patient Active Problem List  Diagnosis    Panic attack    Asthma    Gestational hypertension, third trimester    Class 1 obesity due to excess calories without serious comorbidity with body mass index (BMI) of 30.0 to 30.9 in adult    Severe obesity due to excess calories affecting pregnancy in third trimester    Severe pre-eclampsia in third trimester     (spontaneous vaginal delivery)

## 2025-02-18 NOTE — ANESTHESIA PROCEDURE NOTES
Epidural    Patient location during procedure: OB   Reason for block: primary anesthetic   Reason for block: labor analgesia requested by patient and obstetrician  Diagnosis: IUP   Start time: 2/17/2025 6:08 PM  Timeout: 2/17/2025 6:08 PM  End time: 2/17/2025 6:26 PM  Surgery related to: Vaginal Delivery    Staffing  Performing Provider: Mingo San DO  Authorizing Provider: Yamila Olivarez MD    Staffing  Performed by: Mingo San DO  Authorized by: Yamila Olivarez MD        Preanesthetic Checklist  Completed: patient identified, IV checked, site marked, risks and benefits discussed, surgical consent, monitors and equipment checked, pre-op evaluation, timeout performed, anesthesia consent given, hand hygiene performed and patient being monitored  Preparation  Patient position: sitting  Prep: ChloraPrep  Patient monitoring: Pulse Ox  Reason for block: primary anesthetic   Epidural  Skin Anesthetic: lidocaine 1%  Skin Wheal: 3 mL  Administration type: continuous  Approach: midline  Interspace: L3-4    Injection technique: SALOME saline  Needle and Epidural Catheter  Needle type: Tuohy   Needle gauge: 17  Needle length: 3.5 inches  Needle insertion depth: 6 cm  Catheter type: Connectloud  Catheter size: 19 G  Catheter at skin depth: 10 cm  Insertion Attempts: 1  Test dose: 3 mL of lidocaine 1.5% with Epi 1-to-200,000  Additional Documentation: incremental injection, negative aspiration for heme and CSF, no paresthesia on injection, no signs/symptoms of IV or SA injection, no significant pain on injection and no significant complaints from patient  Needle localization: anatomical landmarks  Medications:  Volume per aspiration: 5 mL  Time between aspirations: 5 minutes   Assessment  Ease of block: easy  Patient's tolerance of the procedure: comfortable throughout block and no complaints  Additional Notes  Very anxious. Hit os multiple times but stayed in one level.  Otherwise, ligament was appreciable  and easy epidural.  No inadvertent dural puncture with Tuohy.  Dural puncture performed with spinal needle.

## 2025-02-18 NOTE — PLAN OF CARE
Problem: Adult Inpatient Plan of Care  Goal: Plan of Care Review  Outcome: Progressing  Goal: Patient-Specific Goal (Individualized)  Outcome: Progressing  Goal: Absence of Hospital-Acquired Illness or Injury  Outcome: Progressing  Goal: Optimal Comfort and Wellbeing  Outcome: Progressing  Goal: Readiness for Transition of Care  Outcome: Progressing     Problem:  Fall Injury Risk  Goal: Absence of Fall, Infant Drop and Related Injury  Outcome: Progressing     Problem: Infection  Goal: Absence of Infection Signs and Symptoms  Outcome: Progressing     Problem: Labor  Goal: Hemostasis  Outcome: Progressing  Goal: Stable Fetal Wellbeing  Outcome: Progressing  Goal: Effective Progression to Delivery  Outcome: Progressing  Goal: Absence of Infection Signs and Symptoms  Outcome: Progressing  Goal: Acceptable Pain Control  Outcome: Progressing  Goal: Normal Uterine Contraction Pattern  Outcome: Progressing     Problem: Anesthesia/Analgesia, Neuraxial  Goal: Safe, Effective Infusion Delivery  Outcome: Progressing  Goal: Stable Patient-Fetal Status  Outcome: Progressing  Goal: Absence of Infection Signs and Symptoms  Outcome: Progressing  Goal: Nausea and Vomiting Relief  Outcome: Progressing  Goal: Effective Pain Control  Outcome: Progressing  Goal: Effective Oxygenation and Ventilation  Outcome: Progressing  Goal: Baseline Motor Function Return  Outcome: Progressing  Goal: Effective Urinary Elimination  Outcome: Progressing     Problem: Hypertensive Disorders in Pregnancy  Goal: Patient-Fetal Stabilization  Outcome: Progressing

## 2025-02-18 NOTE — PROGRESS NOTES
LABOR NOTE    S:  Complaints: No.  Epidural Working:  Yes    O: /81   Pulse 94   Temp 98.6 °F (37 °C) (Oral)   Resp 20   Ht 5' (1.524 m)   Wt 89.3 kg (196 lb 13.9 oz)   LMP 06/01/2024   SpO2 96%   Breastfeeding No   BMI 38.45 kg/m²     FHT: 125 bpm, moderate variability, +accels, rare variable decels; Cat 1 (reassuring)  CTX: q 1-2 min  SVE: 5/70/-2    TIMELINE:   1720: 1/60/-4, unable to place Wayne, patient desires epidural prior. Pitocin ordered  2000: 2/60/-3, Wayne balloon placed, pit at 12  0015: SROM clr  0045: 5/70/-2  0500: 5/70/-2, AROM forebag clear    PLAN:    IOL  - Continue Close Maternal/Fetal Monitoring  - Pitocin Augmentation per protocol  - Recheck 2 hours or PRN    2. PreE w/SF  - Continue magnesium sulfate infusion  - no s/s mag toxicity; continue routine evals  - Recent BP as above  - Asymptomatic  - s/p IV labetalol 20/40/20 (most recent push at 1836)  - PreE labs on admit: Plt 268, Cr 0.7, ALT 8, AST 14, P:C ratio 0.13      Jacqueline Manzanares MD   OB/GYN PGY-3

## 2025-02-19 PROBLEM — F41.9 ANXIETY: Status: ACTIVE | Noted: 2025-02-19

## 2025-02-19 PROBLEM — D64.9 ACUTE ON CHRONIC ANEMIA: Status: ACTIVE | Noted: 2025-02-19

## 2025-02-19 PROBLEM — D62 ACUTE BLOOD LOSS ANEMIA (ABLA): Status: ACTIVE | Noted: 2025-02-19

## 2025-02-19 LAB
BASOPHILS # BLD AUTO: 0.04 K/UL (ref 0–0.2)
BASOPHILS NFR BLD: 0.2 % (ref 0–1.9)
DIFFERENTIAL METHOD BLD: ABNORMAL
EOSINOPHIL # BLD AUTO: 0 K/UL (ref 0–0.5)
EOSINOPHIL NFR BLD: 0.2 % (ref 0–8)
ERYTHROCYTE [DISTWIDTH] IN BLOOD BY AUTOMATED COUNT: 13.6 % (ref 11.5–14.5)
HCT VFR BLD AUTO: 23.3 % (ref 37–48.5)
HGB BLD-MCNC: 8.1 G/DL (ref 12–16)
IMM GRANULOCYTES # BLD AUTO: 0.1 K/UL (ref 0–0.04)
IMM GRANULOCYTES NFR BLD AUTO: 0.6 % (ref 0–0.5)
LYMPHOCYTES # BLD AUTO: 2.9 K/UL (ref 1–4.8)
LYMPHOCYTES NFR BLD: 15.8 % (ref 18–48)
MCH RBC QN AUTO: 29.5 PG (ref 27–31)
MCHC RBC AUTO-ENTMCNC: 34.8 G/DL (ref 32–36)
MCV RBC AUTO: 85 FL (ref 82–98)
MONOCYTES # BLD AUTO: 1.1 K/UL (ref 0.3–1)
MONOCYTES NFR BLD: 6 % (ref 4–15)
NEUTROPHILS # BLD AUTO: 14 K/UL (ref 1.8–7.7)
NEUTROPHILS NFR BLD: 77.2 % (ref 38–73)
NRBC BLD-RTO: 0 /100 WBC
PLATELET # BLD AUTO: 232 K/UL (ref 150–450)
PMV BLD AUTO: 10.9 FL (ref 9.2–12.9)
RBC # BLD AUTO: 2.75 M/UL (ref 4–5.4)
WBC # BLD AUTO: 18.14 K/UL (ref 3.9–12.7)

## 2025-02-19 PROCEDURE — 72100002 HC LABOR CARE, 1ST 8 HOURS

## 2025-02-19 PROCEDURE — 72100003 HC LABOR CARE, EA. ADDL. 8 HRS

## 2025-02-19 PROCEDURE — 85025 COMPLETE CBC W/AUTO DIFF WBC: CPT | Performed by: OBSTETRICS & GYNECOLOGY

## 2025-02-19 PROCEDURE — 36415 COLL VENOUS BLD VENIPUNCTURE: CPT | Performed by: OBSTETRICS & GYNECOLOGY

## 2025-02-19 PROCEDURE — 25000003 PHARM REV CODE 250

## 2025-02-19 PROCEDURE — 72200006 HC VAGINAL DELIVERY LEVEL III

## 2025-02-19 PROCEDURE — 63600175 PHARM REV CODE 636 W HCPCS

## 2025-02-19 PROCEDURE — 11000001 HC ACUTE MED/SURG PRIVATE ROOM

## 2025-02-19 RX ORDER — LABETALOL HYDROCHLORIDE 5 MG/ML
20 INJECTION, SOLUTION INTRAVENOUS ONCE
Status: COMPLETED | OUTPATIENT
Start: 2025-02-19 | End: 2025-02-19

## 2025-02-19 RX ORDER — DOCUSATE SODIUM 100 MG/1
100 CAPSULE, LIQUID FILLED ORAL DAILY
Status: DISCONTINUED | OUTPATIENT
Start: 2025-02-19 | End: 2025-02-21

## 2025-02-19 RX ORDER — NIFEDIPINE 30 MG/1
30 TABLET, EXTENDED RELEASE ORAL ONCE
Status: COMPLETED | OUTPATIENT
Start: 2025-02-19 | End: 2025-02-19

## 2025-02-19 RX ORDER — NIFEDIPINE 30 MG/1
60 TABLET, EXTENDED RELEASE ORAL DAILY
Status: DISCONTINUED | OUTPATIENT
Start: 2025-02-20 | End: 2025-02-20

## 2025-02-19 RX ADMIN — NIFEDIPINE 30 MG: 30 TABLET, FILM COATED, EXTENDED RELEASE ORAL at 08:02

## 2025-02-19 RX ADMIN — PRENATAL VIT W/ FE FUMARATE-FA TAB 27-0.8 MG 1 TABLET: 27-0.8 TAB at 08:02

## 2025-02-19 RX ADMIN — MISOPROSTOL 200 MCG: 200 TABLET ORAL at 01:02

## 2025-02-19 RX ADMIN — MISOPROSTOL 200 MCG: 200 TABLET ORAL at 06:02

## 2025-02-19 RX ADMIN — DOCUSATE SODIUM 200 MG: 100 CAPSULE, LIQUID FILLED ORAL at 08:02

## 2025-02-19 RX ADMIN — OXYCODONE HYDROCHLORIDE 5 MG: 5 TABLET ORAL at 02:02

## 2025-02-19 RX ADMIN — LABETALOL HYDROCHLORIDE 20 MG: 5 INJECTION INTRAVENOUS at 01:02

## 2025-02-19 RX ADMIN — OXYCODONE HYDROCHLORIDE 5 MG: 5 TABLET ORAL at 09:02

## 2025-02-19 RX ADMIN — MISOPROSTOL 200 MCG: 200 TABLET ORAL at 12:02

## 2025-02-19 RX ADMIN — ACETAMINOPHEN 650 MG: 325 TABLET, FILM COATED ORAL at 06:02

## 2025-02-19 RX ADMIN — IBUPROFEN 600 MG: 600 TABLET ORAL at 12:02

## 2025-02-19 RX ADMIN — IBUPROFEN 600 MG: 600 TABLET ORAL at 11:02

## 2025-02-19 RX ADMIN — ACETAMINOPHEN 650 MG: 325 TABLET, FILM COATED ORAL at 10:02

## 2025-02-19 RX ADMIN — IBUPROFEN 600 MG: 600 TABLET ORAL at 06:02

## 2025-02-19 RX ADMIN — NIFEDIPINE 30 MG: 30 TABLET, FILM COATED, EXTENDED RELEASE ORAL at 01:02

## 2025-02-19 RX ADMIN — MAGNESIUM SULFATE HEPTAHYDRATE 2 G/HR: 40 INJECTION, SOLUTION INTRAVENOUS at 10:02

## 2025-02-19 RX ADMIN — ACETAMINOPHEN 650 MG: 325 TABLET, FILM COATED ORAL at 03:02

## 2025-02-19 RX ADMIN — ACETAMINOPHEN 650 MG: 325 TABLET, FILM COATED ORAL at 11:02

## 2025-02-19 RX ADMIN — OXYCODONE HYDROCHLORIDE 5 MG: 5 TABLET ORAL at 08:02

## 2025-02-19 NOTE — PROGRESS NOTES
POSTPARTUM PROGRESS NOTE    Subjective:     PPD/POD#: 1   Procedure:    EGA: 37w3d   N/V: No   F/C: No fever. She notes having the shakes overnight.    Abd Pain: Mild, well-controlled with oral pain medication   Lochia: Mild   Voiding: Yes   Ambulating: Yes   Bowel fnc: No; denies bowel movements and passing flatus.    Contraception: NFP/Condoms     Objective:     Temp:  [98 °F (36.7 °C)-98.9 °F (37.2 °C)] 98.6 °F (37 °C)  Pulse:  [] 85  Resp:  [16-18] 18  SpO2:  [90 %-99 %] 90 %  BP: (105-179)/() 115/59    Abdomen: Soft, appropriately tender   Uterus: Firm, no fundal tenderness   Incision: N/A   2+ DTR     Lab Review    Recent Labs   Lab 25  1644      K 4.2      CO2 19*   BUN 12   CREATININE 0.7      PROT 6.7   BILITOT 0.2   ALKPHOS 187*   ALT 8*   AST 14       Recent Labs   Lab 25  1644 25  0322   WBC 12.44 18.14*   HGB 10.4* 8.1*   HCT 30.3* 23.3*   MCV 84 85    232         I/O    Intake/Output Summary (Last 24 hours) at 2025 0601  Last data filed at 2025 0400  Gross per 24 hour   Intake 4110.15 ml   Output 4422 ml   Net -311.85 ml          Assessment and Plan:   1.Postpartum care:  - Patient doing well.  - Continue routine management and advances.    2. PreE w/SF  - BP as above  - asymptomatic  - preE labs as above  - UOP: 2.2 cc/kg/hr  - Mag: continue  - Hypertensive agent: Labetalol 20, Procardia XL 30       3. Acute blood loss anemia  - H/H as above  - QBL: 530 ml  - asymptomatic  - iron/colace  --s/p cytotec/hemabate    4. 2nd degree lac  - Stable    5. Mood Disorder (H/o Panic attacks)   - Mood stable  - Medications: not on any home medications  - Will need 1-2 week postpartum mood check     6.Obesity  - ppBMI: 30  - Postpartum lovenox not indicated at this time     7. Asthma   - Hx of sports induced asthma 12 years ago, has not had to use an inhaler at any point.  - Received hemabate. No reaction noted following administration.    Lucy  James, MS3  [employer]    Seen and examined.  Agree with note.  All questions answered  Good reflexes and UOP.  No s/sx of magnesium toxicity.  Will discontinue magnesium 24 hours after delivery.  Asymptomatic acute blood loss anemia.

## 2025-02-19 NOTE — PLAN OF CARE
VSS. IV labetalol x 1 this shift. Patient ambulating and voiding independently and without difficulty. Fundus firm without massage, midline, with light rubra noted. S/p cytotec series. Pain controlled with PRN pain medications. Safety maintained, bed low and in a locked position. Encouraged to pump every 2-3 hours. No further concerns at this time, will continue to monitor.

## 2025-02-19 NOTE — NURSING
[employer] pump, tubing, collections containers and labels brought to bedside.  Discussed proper pump setting of initiation phase.  Instructed on proper usage of pump and to adjust suction according to maximum comfort level.  Verified appropriate flange fit.  Educated on the frequency and duration of pumping in order to promote and maintain a full milk supply.  Hands on pumping technique reviewed.  Encouraged hand expression after pumping.  Instructed on cleaning of breast pump parts.  Written instructions also given.  Pt verbalized understanding and appropriate recall for proper milk handling, collection, labeling, storage and transportation.

## 2025-02-19 NOTE — ANESTHESIA POSTPROCEDURE EVALUATION
Anesthesia Post Evaluation    Patient: Josie Rouse    Procedure(s) Performed: * No procedures listed *    Final Anesthesia Type: epidural      Patient location during evaluation: floor  Patient participation: Yes- Able to Participate  Level of consciousness: awake and alert  Post-procedure vital signs: reviewed and stable  Pain management: adequate  Airway patency: patent  ZAMZAM mitigation strategies: Multimodal analgesia  PONV status at discharge: No PONV  Anesthetic complications: no      Cardiovascular status: blood pressure returned to baseline  Respiratory status: unassisted and spontaneous ventilation  Hydration status: euvolemic  Follow-up not needed.              Vitals Value Taken Time   /80 02/19/25 09:01   Temp 36.9 °C (98.5 °F) 02/19/25 08:00   Pulse 93 02/19/25 09:13   Resp 16 02/19/25 08:00   SpO2 93 % 02/19/25 09:13   Vitals shown include unfiled device data.      No case tracking events are documented in the log.      Pain/Mike Score: Pain Rating Prior to Med Admin: 4 (2/19/2025  8:00 AM)  Pain Rating Post Med Admin: 2 (2/19/2025  9:00 AM)

## 2025-02-19 NOTE — MEDICAL/APP STUDENT
POSTPARTUM PROGRESS NOTE    Subjective:     PPD/POD#: 1   Procedure:    EGA: 37w3d   N/V: No   F/C: No fever. She notes having the shakes overnight.    Abd Pain: Mild, well-controlled with oral pain medication   Lochia: Mild   Voiding: Yes   Ambulating: Yes   Bowel fnc: No; denies bowel movements and passing flatus.    Contraception: NFP/Condoms     Objective:     Temp:  [98 °F (36.7 °C)-98.9 °F (37.2 °C)] 98.6 °F (37 °C)  Pulse:  [] 85  Resp:  [16-18] 18  SpO2:  [90 %-99 %] 90 %  BP: (105-179)/() 115/59    Abdomen: Soft, appropriately tender   Uterus: Firm, no fundal tenderness   Incision: N/A     Lab Review    Recent Labs   Lab 25  1644      K 4.2      CO2 19*   BUN 12   CREATININE 0.7      PROT 6.7   BILITOT 0.2   ALKPHOS 187*   ALT 8*   AST 14       Recent Labs   Lab 25  1644 25  0322   WBC 12.44 18.14*   HGB 10.4* 8.1*   HCT 30.3* 23.3*   MCV 84 85    232         I/O    Intake/Output Summary (Last 24 hours) at 2025 0601  Last data filed at 2025 0400  Gross per 24 hour   Intake 4110.15 ml   Output 4422 ml   Net -311.85 ml          Assessment and Plan:   1.Postpartum care:  - Patient doing well.  - Continue routine management and advances.    2. PreE w/SF  - BP as above  - asymptomatic  - preE labs as above  - UOP: 2.2 cc/kg/hr  - Mag: continue  - Hypertensive agent: Labetalol 20, Procardia XL 30       3. Acute blood loss anemia  - H/H as above  - QBL: 530 ml  - asymptomatic  - iron/colace  --s/p cytotec/hemabate    4. 2nd degree lac  - Stable    5. Mood Disorder (H/o Panic attacks)   - Mood stable  - Medications: not on any home medications  - Will need 1-2 week postpartum mood check     6.Obesity  - ppBMI: 30  - Postpartum lovenox not indicated at this time     7. Asthma   - Hx of sports induced asthma 12 years ago, has not had to use an inhaler at any point.  - Received hemabate. No reaction noted following administration.    Lucy  James, MS3  [employer]

## 2025-02-19 NOTE — PLAN OF CARE
Patient's VSS, no s/s of distress noted.  Continues to be on the magnesium drip. Pt. Instructed to call for assistance when needed.

## 2025-02-20 PROCEDURE — 25000003 PHARM REV CODE 250

## 2025-02-20 PROCEDURE — 11000001 HC ACUTE MED/SURG PRIVATE ROOM

## 2025-02-20 RX ORDER — NIFEDIPINE 30 MG/1
90 TABLET, EXTENDED RELEASE ORAL DAILY
Status: DISCONTINUED | OUTPATIENT
Start: 2025-02-21 | End: 2025-02-21 | Stop reason: HOSPADM

## 2025-02-20 RX ORDER — NIFEDIPINE 30 MG/1
30 TABLET, EXTENDED RELEASE ORAL ONCE
Status: COMPLETED | OUTPATIENT
Start: 2025-02-20 | End: 2025-02-20

## 2025-02-20 RX ORDER — NIFEDIPINE 30 MG/1
60 TABLET, EXTENDED RELEASE ORAL DAILY
Status: DISCONTINUED | OUTPATIENT
Start: 2025-02-20 | End: 2025-02-20

## 2025-02-20 RX ADMIN — NIFEDIPINE 60 MG: 30 TABLET, FILM COATED, EXTENDED RELEASE ORAL at 06:02

## 2025-02-20 RX ADMIN — IBUPROFEN 600 MG: 600 TABLET ORAL at 11:02

## 2025-02-20 RX ADMIN — NIFEDIPINE 30 MG: 30 TABLET, FILM COATED, EXTENDED RELEASE ORAL at 12:02

## 2025-02-20 RX ADMIN — PRENATAL VIT W/ FE FUMARATE-FA TAB 27-0.8 MG 1 TABLET: 27-0.8 TAB at 08:02

## 2025-02-20 RX ADMIN — ACETAMINOPHEN 650 MG: 325 TABLET, FILM COATED ORAL at 11:02

## 2025-02-20 RX ADMIN — ACETAMINOPHEN 650 MG: 325 TABLET, FILM COATED ORAL at 05:02

## 2025-02-20 RX ADMIN — OXYCODONE HYDROCHLORIDE 5 MG: 5 TABLET ORAL at 03:02

## 2025-02-20 RX ADMIN — OXYCODONE HYDROCHLORIDE 5 MG: 5 TABLET ORAL at 11:02

## 2025-02-20 RX ADMIN — OXYCODONE HYDROCHLORIDE 5 MG: 5 TABLET ORAL at 05:02

## 2025-02-20 RX ADMIN — ACETAMINOPHEN 650 MG: 325 TABLET, FILM COATED ORAL at 06:02

## 2025-02-20 RX ADMIN — IBUPROFEN 600 MG: 600 TABLET ORAL at 06:02

## 2025-02-20 RX ADMIN — DOCUSATE SODIUM 200 MG: 100 CAPSULE, LIQUID FILLED ORAL at 08:02

## 2025-02-20 RX ADMIN — IBUPROFEN 600 MG: 600 TABLET ORAL at 05:02

## 2025-02-20 RX ADMIN — DOCUSATE SODIUM 200 MG: 100 CAPSULE, LIQUID FILLED ORAL at 09:02

## 2025-02-20 NOTE — PROGRESS NOTES
POSTPARTUM PROGRESS NOTE    Subjective:     PPD/POD#: 2   Procedure:    EGA: 37w3d   N/V: No   F/C: No   Abd Pain: Mild, well-controlled with oral pain medication   Lochia: Mild   Voiding: Yes   Ambulating: Yes   Bowel fnc: Yes   Contraception: NFP/condoms     Objective:      Temp:  [97.4 °F (36.3 °C)-99 °F (37.2 °C)] 98.4 °F (36.9 °C)  Pulse:  [] 87  Resp:  [16-18] 18  SpO2:  [90 %-98 %] 98 %  BP: (113-167)/() 150/68    Abdomen: Soft, appropriately tender   Uterus: Firm, no fundal tenderness   Incision: N/A     Lab Review    Recent Labs   Lab 25  1644      K 4.2      CO2 19*   BUN 12   CREATININE 0.7      PROT 6.7   BILITOT 0.2   ALKPHOS 187*   ALT 8*   AST 14       Recent Labs   Lab 25  1644 25  0322   WBC 12.44 18.14*   HGB 10.4* 8.1*   HCT 30.3* 23.3*   MCV 84 85    232         I/O    Intake/Output Summary (Last 24 hours) at 2025 0745  Last data filed at 2025 1200  Gross per 24 hour   Intake 1034.68 ml   Output 2500 ml   Net -1465.32 ml        Assessment and Plan:   Postpartum care:  - Patient doing well.  - Continue routine management and advances.    Asymptomatic acute blood loss anemia.     Pre-e - increased bp medication yesterday.  Will monitor to see if we need to increase again today.  She is aware of the times that she needs her vitals taken.   She will return from the NICU to have them done.    Mood stable

## 2025-02-20 NOTE — PROGRESS NOTES
How to Care for Yourself After Delivery booklet reviewed. Pt verbalized understanding that she will check her BP at least once a day when discharged via [facility]. Pt also verbalized understanding that she will call/come in through the [facility] for any SS of HTN or any severe range BP.

## 2025-02-20 NOTE — PLAN OF CARE
Pt stable. Mild to moderate range blood pressures overnight. Pain well controlled with PRN pain medication. Pt ambulating independently and voiding without difficulty. Fundus midline, firm, with light lochia. Patient safety maintained, side rails up, bed low and locked position. Infant in NICU. Patient pumping and visiting infant regularly. Will continue to round and intervene as needed.

## 2025-02-20 NOTE — PLAN OF CARE
VSS. Patient ambulating and voiding independently and without difficulty. Fundus firm without massage, midline, with light rubra noted. Pain controlled with PRN pain medications. Safety maintained, bed low and in a locked position. Pumping every 2-3 hours. No further concerns at this time, will continue to monitor.

## 2025-02-20 NOTE — LACTATION NOTE
02/20/25 1215   Maternal Assessment   Breast Shape Bilateral:;pendulous   Breast Density Bilateral:;soft   Areola Bilateral:;elastic   Nipples Bilateral:;everted   Maternal Infant Feeding   Maternal Emotional State assist needed;relaxed   Equipment Type   Breast Pump Type double electric, hospital grade   Breast Pump Flange Type hard   Breast Pump Flange Size   (18)   Breast Pumping   Breast Pumping Interventions frequent pumping encouraged     Assisted pt with use of [employer] breastpump and hand expression. Drops collected in syringe using hand expression. Discharge education provided on pumping for the NICU infant. Pt will use her spectra breastpump but is aware of [employer] breastpump rentals. Questions answered.

## 2025-02-20 NOTE — LACTATION NOTE
This note was copied from a baby's chart.  Lactation Note:   Met mother at bedside; Introduced self. Mom reports breast feeding some in MBU prior to transfer to nicu and beginning to pump today. LC assisted with pump set up/session at baby's nicu bedside today (2-18mm flanges provided for best fit). Discussed the importance of frequent pumping in first two weeks to establish a full breast milk supply. Encouraged pumping 8 or more times in 24 hours and skin to skin care when baby able. Discussed pumping every 2-3 hours with only one 5-hour break without pumping for sleep. Recommended pumping schedule discussed. . Benefits of breast milk for baby and benefits of providing breast milk for mother discussed. Encouragement and support offered to mom.

## 2025-02-20 NOTE — LACTATION NOTE
This note was copied from a baby's chart.  Brief bedside contact:  Mom pumping with drops of colostrum today-praised mom. Plan to assist mom with latch/breast feeding per next feeding cues/scheduled feeding time. Encouragement/support provided.

## 2025-02-21 VITALS
WEIGHT: 206 LBS | BODY MASS INDEX: 40.44 KG/M2 | TEMPERATURE: 99 F | HEIGHT: 60 IN | OXYGEN SATURATION: 96 % | HEART RATE: 97 BPM | RESPIRATION RATE: 18 BRPM | SYSTOLIC BLOOD PRESSURE: 137 MMHG | DIASTOLIC BLOOD PRESSURE: 83 MMHG

## 2025-02-21 LAB
BLD PROD TYP BPU: NORMAL
BLD PROD TYP BPU: NORMAL
BLOOD UNIT EXPIRATION DATE: NORMAL
BLOOD UNIT EXPIRATION DATE: NORMAL
BLOOD UNIT TYPE CODE: 6200
BLOOD UNIT TYPE CODE: 6200
BLOOD UNIT TYPE: NORMAL
BLOOD UNIT TYPE: NORMAL
CODING SYSTEM: NORMAL
CODING SYSTEM: NORMAL
CROSSMATCH INTERPRETATION: NORMAL
CROSSMATCH INTERPRETATION: NORMAL
DISPENSE STATUS: NORMAL
DISPENSE STATUS: NORMAL
NUM UNITS TRANS PACKED RBC: NORMAL
NUM UNITS TRANS PACKED RBC: NORMAL

## 2025-02-21 PROCEDURE — 25000003 PHARM REV CODE 250

## 2025-02-21 RX ORDER — IBUPROFEN 600 MG/1
600 TABLET ORAL EVERY 6 HOURS
Qty: 60 TABLET | Refills: 0 | Status: SHIPPED | OUTPATIENT
Start: 2025-02-21

## 2025-02-21 RX ORDER — ACETAMINOPHEN 325 MG/1
650 TABLET ORAL EVERY 6 HOURS
Qty: 60 TABLET | Refills: 0 | Status: SHIPPED | OUTPATIENT
Start: 2025-02-21

## 2025-02-21 RX ORDER — NIFEDIPINE 90 MG/1
90 TABLET, EXTENDED RELEASE ORAL DAILY
Qty: 30 TABLET | Refills: 11 | Status: SHIPPED | OUTPATIENT
Start: 2025-02-21 | End: 2025-02-21

## 2025-02-21 RX ORDER — IRON POLYSACCHARIDE COMPLEX 150 MG
150 CAPSULE ORAL DAILY
Qty: 60 CAPSULE | Refills: 0 | Status: SHIPPED | OUTPATIENT
Start: 2025-02-21

## 2025-02-21 RX ORDER — DOCUSATE SODIUM 100 MG/1
200 CAPSULE, LIQUID FILLED ORAL 2 TIMES DAILY
Qty: 120 CAPSULE | Refills: 0 | Status: SHIPPED | OUTPATIENT
Start: 2025-02-21

## 2025-02-21 RX ORDER — DOCUSATE SODIUM 100 MG/1
200 CAPSULE, LIQUID FILLED ORAL 2 TIMES DAILY
Status: DISCONTINUED | OUTPATIENT
Start: 2025-02-21 | End: 2025-02-21 | Stop reason: HOSPADM

## 2025-02-21 RX ORDER — DOCUSATE SODIUM 100 MG/1
200 CAPSULE, LIQUID FILLED ORAL 2 TIMES DAILY
Qty: 120 CAPSULE | Refills: 0 | Status: SHIPPED | OUTPATIENT
Start: 2025-02-21 | End: 2025-02-21

## 2025-02-21 RX ORDER — NIFEDIPINE 90 MG/1
90 TABLET, EXTENDED RELEASE ORAL DAILY
Qty: 30 TABLET | Refills: 11 | Status: SHIPPED | OUTPATIENT
Start: 2025-02-21 | End: 2026-02-21

## 2025-02-21 RX ORDER — IBUPROFEN 600 MG/1
600 TABLET ORAL EVERY 6 HOURS
Qty: 60 TABLET | Refills: 0 | Status: SHIPPED | OUTPATIENT
Start: 2025-02-21 | End: 2025-02-21

## 2025-02-21 RX ORDER — ACETAMINOPHEN 325 MG/1
650 TABLET ORAL EVERY 6 HOURS
Qty: 60 TABLET | Refills: 0 | Status: SHIPPED | OUTPATIENT
Start: 2025-02-21 | End: 2025-02-21

## 2025-02-21 RX ORDER — IRON POLYSACCHARIDE COMPLEX 150 MG
150 CAPSULE ORAL DAILY
Status: DISCONTINUED | OUTPATIENT
Start: 2025-02-21 | End: 2025-02-21 | Stop reason: HOSPADM

## 2025-02-21 RX ADMIN — IBUPROFEN 600 MG: 600 TABLET ORAL at 05:02

## 2025-02-21 RX ADMIN — PRENATAL VIT W/ FE FUMARATE-FA TAB 27-0.8 MG 1 TABLET: 27-0.8 TAB at 08:02

## 2025-02-21 RX ADMIN — OXYCODONE HYDROCHLORIDE 5 MG: 5 TABLET ORAL at 06:02

## 2025-02-21 RX ADMIN — DOCUSATE SODIUM 200 MG: 100 CAPSULE, LIQUID FILLED ORAL at 08:02

## 2025-02-21 RX ADMIN — POLYSACCHARIDE-IRON COMPLEX 150 MG: 150 CAPSULE ORAL at 08:02

## 2025-02-21 RX ADMIN — ACETAMINOPHEN 650 MG: 325 TABLET, FILM COATED ORAL at 05:02

## 2025-02-21 RX ADMIN — NIFEDIPINE 90 MG: 30 TABLET, FILM COATED, EXTENDED RELEASE ORAL at 08:02

## 2025-02-21 RX ADMIN — ACETAMINOPHEN 650 MG: 325 TABLET, FILM COATED ORAL at 02:02

## 2025-02-21 RX ADMIN — IBUPROFEN 600 MG: 600 TABLET ORAL at 02:02

## 2025-02-21 NOTE — LACTATION NOTE
This note was copied from a baby's chart.  Lactation to bedside for 1800 Latch:  Assisted mom with s2s in football on left, infant latching slightly shallow-attempting to assist for deeper, more effective latch-achieved briefly with some non-nutritive sucking. LC then assisted with repositioning in cross cradle on right (while bottle warming) and Zulema able to latch deeply/effectively with some good tugs/pulls~3min, then fell asleep on mom-praised mom and baby. Mom now pumping regularly on schedule with drops of colostrum (used while at the breast for oral immune therapy). Encouraged mom to practice breast feeding 10-15min, then offer full minimum ordered feeding volume of her pumped or donor ebm (until mom's milk supply begins to come in/establish). Foot stool,pillows and blankets used for positioning/comfort with breast feeding. Encouragement and support provided. LC reiterated the importance of a deep effective latch with good tugs/pulls and comfortable latch for milk transfer once mom's milk comes in. Mom to ask for assist as needed from lactation.

## 2025-02-21 NOTE — DISCHARGE SUMMARY
Delivery Discharge Summary  Obstetrics      Primary OB Clinician: Ansley Peterson MD      Admission date: 2025  Discharge date: 2025    Disposition: To home, self care    Discharge Diagnosis List:    Problem List[1]    Procedure:     Hospital Course:  Josie Rouse is a 34 y.o. now , PPD #3 who was admitted on 2025 at 37w2d for IOL. Patient was subsequently admitted to labor and delivery unit with signed consents.     Labor course was uncomplicated and resulted in  without complications.     Please see delivery note for further details. Her postpartum course was complicated by PReE w/ SF and ABLA for which she was started on magnesium, titrated to procardia 90mg XL, and was started on iron/colace. On discharge day, patient's pain is controlled with oral pain medications. Pt is tolerating ambulation without SOB or CP, and regular diet without N/V. Reports lochia is mild. Denies any HA, vision changes, F/C, LE swelling. Denies any breast pain/soreness.    Pt in stable condition and ready for discharge. She has been instructed to start and/or continue medications and follow up with her obstetrics provider as listed below.    Vital Signs:   Temp:  [97.9 °F (36.6 °C)-99.3 °F (37.4 °C)] 98.2 °F (36.8 °C)  Pulse:  [] 88  Resp:  [16-18] 18  SpO2:  [94 %-98 %] 94 %  BP: (119-156)/(64-83) 127/76    Physical Exam:  Abdomen: Soft, appropriately tender   Uterus: Firm, no fundal tenderness   Incision: N/A       Pertinent studies:  CBC  Recent Labs   Lab 25  1644 25  0322   WBC 12.44 18.14*   HGB 10.4* 8.1*   HCT 30.3* 23.3*   MCV 84 85    232          Immunization History   Administered Date(s) Administered    COVID-19, MRNA, LN-S, PF (Pfizer) (Purple Cap) 2021, 2021    HPV Quadrivalent 2008, 2010, 2011    Hepatitis A / Hepatitis B 2008, 2008, 2010    IPV 2008    Influenza 2019    Influenza (Flumist) -  "Quadrivalent - Intranasal *Preferred* (2-49 years old) 2015    Influenza - Intranasal 10/07/2009    Influenza - Intranasal - Trivalent 2008, 10/06/2009, 2010, 2011    Influenza - Quadrivalent - PF *Preferred* (6 months and older) 10/23/2018, 2021, 10/15/2022    Influenza - Trivalent - Fluarix, Flulaval, Fluzone, Afluria - PF 10/25/2012    Influenza A (H1N1) 2009 Monovalent - IM 2010    Influenza Split 10/27/2010    Influenza Whole 10/27/2010    Meningococcal Conjugate (MCV4P) 2008, 2022    PPD Test 2008, 2022    Rsv, Bivalent, Rsvpref (Abrysvo) 2025    Tdap 2008, 2022, 2024    Yellow Fever 2008        Delivery:    Episiotomy: None   Lacerations:     Repair suture:     Repair # of packets:     Blood loss (ml):       Birth information:  YOB: 2025   Time of birth: 11:50 AM   Sex: female   Delivery type: Vaginal, Spontaneous   Gestational Age: 37w3d     Measurements    Weight: 2860 g  Weight (lbs): 6 lb 4.9 oz  Length: 50.2 cm  Length (in): 19.75"  Head circumference: 33.7 cm  Chest circumference: 31.1 cm         Delivery Clinician: Delivery Providers    Delivering clinician: Ansley Peterson MD   Provider Role    Kristie Hernández MD 1st Call Resident    Yasmine Talavera RN Registered Nurse    Trina, Temi, RN Registered Nurse             Additional  information:  Forceps:    Vacuum:    Breech:    Observed anomalies      Living?:     Apgars    Living status: Living  Apgar Component Scores:  1 min.:  5 min.:  10 min.:  15 min.:  20 min.:    Skin color:  1  1       Heart rate:  2  2       Reflex irritability:  2  2       Muscle tone:  2  2       Respiratory effort:  2  2       Total:  9  9       Apgars assigned by: NICU         Placenta: Delivered:       appearance    Patient Instructions:   Current Discharge Medication List        START taking these medications    Details   acetaminophen (TYLENOL) 325 MG " tablet Take 2 tablets (650 mg total) by mouth every 6 (six) hours. Alternate between ibuprofen and tylenol every 3 hours. For example: @0800: ibuprofen 600mg @1100: tylenol 650mg @1400: ibuprofen 600mg @1700: tylenol 650 mg @2000: ibuprofen 600mg  Qty: 60 tablet, Refills: 0      docusate sodium (COLACE) 100 MG capsule Take 2 capsules (200 mg total) by mouth 2 (two) times daily.  Qty: 120 capsule, Refills: 0      ibuprofen (ADVIL,MOTRIN) 600 MG tablet Take 1 tablet (600 mg total) by mouth every 6 (six) hours. Alternate between ibuprofen and tylenol every 3 hours. For example: @0800: ibuprofen 600mg @1100: tylenol 650mg @1400: ibuprofen 600mg @1700: tylenol 650 mg @2000: ibuprofen 600mg  Qty: 60 tablet, Refills: 0      iron polysaccharides (NIFEREX) 150 mg iron Cap Take 1 capsule (150 mg total) by mouth once daily.  Qty: 60 capsule, Refills: 0      NIFEdipine (PROCARDIA-XL) 90 MG (OSM) 24 hr tablet Take 1 tablet (90 mg total) by mouth once daily.  Qty: 30 tablet, Refills: 11    Comments: .           CONTINUE these medications which have NOT CHANGED    Details   prenatal vit/iron fum/folic ac (PRENATAL 1+1 ORAL) Take by mouth.             Discharge Procedure Orders   Diet Adult Regular     No driving until:   Order Comments: - Not taking narcotic pain medications  - You feel that you can safely slam on the brakes     Pelvic Rest   Order Comments: Nothing in vagina until evaluation at postpartum visit (no sex, tampons, or douching)     Notify your health care provider if you experience any of the following:  temperature >100.4     Notify your health care provider if you experience any of the following:  persistent nausea and vomiting or diarrhea     Notify your health care provider if you experience any of the following:  severe uncontrolled pain     Notify your health care provider if you experience any of the following:  redness, tenderness, or signs of infection (pain, swelling, redness, odor or green/yellow  discharge around incision site)     Notify your health care provider if you experience any of the following:  difficulty breathing or increased cough     Notify your health care provider if you experience any of the following:  severe persistent headache     Notify your health care provider if you experience any of the following:  worsening rash     Notify your health care provider if you experience any of the following:  persistent dizziness, light-headedness, or visual disturbances     Notify your health care provider if you experience any of the following:  increased confusion or weakness     Notify your health care provider if you experience any of the following:   Order Comments: - Heavy vaginal bleeding soaking through more than 2 pads/hour for > 2 hours  - Severe abdominal pain  - Headache not relieved with Tylenol  - Vision changes  - Chest pain or shortness or breath     Activity as tolerated        Follow-up Information       Ansley Peterson MD Follow up in 6 week(s).    Specialties: Obstetrics and Gynecology, Obstetrics  Why: 6 week post partum visit  Contact information:  [address] [id number] [phone]               Ansley Peterson MD Follow up.    Specialties: Obstetrics and Gynecology, Obstetrics  Why: 3 day BP check and mood check  Contact information:  [address] [id number] [phone]                              Hanna Drew MD (Mary)   Obstetrics and Gynecology, PGY1         [1]   Patient Active Problem List  Diagnosis    Panic attack    Asthma    Gestational hypertension, third trimester    Class 1 obesity due to excess calories without serious comorbidity with body mass index (BMI) of 30.0 to 30.9 in adult    Severe obesity due to excess calories affecting pregnancy in third trimester    Severe pre-eclampsia in third trimester     (spontaneous vaginal delivery)    Anxiety    Acute on chronic anemia    Acute blood loss anemia  (SHELIA)

## 2025-02-21 NOTE — LACTATION NOTE
This note was copied from a baby's chart.  Mother/Baby being followed by lactation.  LC assisted mother with breastfeeding session. Zulema awake and eager to latch. Infant positioned in football hold to right breast then cross cradle hold to left breast. Infant suckled actively x 10 min each breast. Beautiful breastfeeding session. Infant also fed 0.2 ml colostrum mom had brought up in syringe. Encouraged pumping post breastfeeding. Encouraged practice latching. Praise and ongoing lactation support offered,  Laverne Loyola, BSN, RNC, IBCLC

## 2025-02-21 NOTE — PLAN OF CARE
Breastfeeding discharge education reviewed. Questions answered. Pt verbalized understanding. Pt has resources to contact for breastfeeding questions  supportt.

## 2025-02-21 NOTE — PROGRESS NOTES
POSTPARTUM PROGRESS NOTE    Subjective:     PPD/POD#: 3   Procedure:    EGA: 37w3d   N/V: No   F/C: No   Abd Pain: Mild, well-controlled with oral pain medication   Lochia: Mild   Voiding: Yes   Ambulating: Yes   Bowel fnc: Yes   Contraception: NFP/condoms     Objective:      Temp:  [97.9 °F (36.6 °C)-99.3 °F (37.4 °C)] 98.2 °F (36.8 °C)  Pulse:  [] 88  Resp:  [16-18] 18  SpO2:  [94 %-98 %] 94 %  BP: (119-156)/(64-83) 127/76    Abdomen: Soft, appropriately tender   Uterus: Firm, no fundal tenderness   Incision: N/A     Lab Review    Recent Labs   Lab 25  1644      K 4.2      CO2 19*   BUN 12   CREATININE 0.7      PROT 6.7   BILITOT 0.2   ALKPHOS 187*   ALT 8*   AST 14       Recent Labs   Lab 25  1644 25  0322   WBC 12.44 18.14*   HGB 10.4* 8.1*   HCT 30.3* 23.3*   MCV 84 85    232         I/O  No intake or output data in the 24 hours ending 25 0638       Assessment and Plan:   Postpartum care:  - Patient doing well.  - Continue routine management and advances.    ABLA   - H/H as above  - QBL: 530  - asymptomatic  - iron/colace      PreE w/SF  - BP as above  - asymptomatic  - preE labs as above  - UOP: voiding spontaneously   - S/P mag   - Hypertensive agent: procardia 90mg ()     Mood disorder  - Mood stable  - 2 week PP mood check     Hanna Drew MD (Mary)   Obstetrics and Gynecology, PGY1

## 2025-02-24 ENCOUNTER — LACTATION ENCOUNTER (OUTPATIENT)
Dept: INTENSIVE CARE | Facility: OTHER | Age: 35
End: 2025-02-24

## 2025-02-24 ENCOUNTER — PATIENT MESSAGE (OUTPATIENT)
Dept: OBSTETRICS AND GYNECOLOGY | Facility: CLINIC | Age: 35
End: 2025-02-24
Payer: OTHER GOVERNMENT

## 2025-02-24 ENCOUNTER — PATIENT MESSAGE (OUTPATIENT)
Dept: OBSTETRICS AND GYNECOLOGY | Facility: OTHER | Age: 35
End: 2025-02-24
Payer: OTHER GOVERNMENT

## 2025-02-24 NOTE — LACTATION NOTE
This note was copied from a baby's chart.  Mother/Baby being followed by lactation.  LC assisted with breastfeeding session. Zulema awake and eager. LC assisted with positioning in cross cradle hold to each breast. Baby breast fed actively x about 12-14 min. A few audible swallows noted. Encouraged breast compressions with suckling. Infant transferred 8 ml at breast with pre/post weights. Mother pumping frequently 7-8 x/day x 15 min; 20 ml/pump (day 6). Encouraged deep latch and continued practice with breast compressions and pumping post breastfeeding until infant fully established to breast with a full milk supply.   Discussed signs of a good latch:          You should feel long, rhythmic, pulling sucks and hear swallows throughout feeding          Your baby's lips should look wet at the end of the feeding         Laverne Loyola, BSN, RNC, IBCLC

## 2025-02-24 NOTE — PROGRESS NOTES
I phoned Ms. Rouse to check on her BP and preeclampsia s/s. Her BP today was 137/89, . She reports moderate bleeding. She denies all preeclampsia s/s except that she does have a HA that is now fading after Tylenol,  but not gone.totally. I instructed her to go to OB ED if her HA does not resolve. I also encouraged her to call OB ED for any other pre-e s/s or BP > 155/105.    Also note, she told me that she took tylenol 3 times this morning. I instructed her that no more than 3000mg is allowed per day, but she can take ibuprofen. I told her to follow instructions on the bottle for frequency.

## 2025-02-26 ENCOUNTER — LACTATION ENCOUNTER (OUTPATIENT)
Dept: INTENSIVE CARE | Facility: OTHER | Age: 35
End: 2025-02-26

## 2025-02-26 ENCOUNTER — PATIENT MESSAGE (OUTPATIENT)
Dept: OBSTETRICS AND GYNECOLOGY | Facility: CLINIC | Age: 35
End: 2025-02-26
Payer: OTHER GOVERNMENT

## 2025-02-26 ENCOUNTER — HOSPITAL ENCOUNTER (EMERGENCY)
Facility: OTHER | Age: 35
Discharge: HOME | End: 2025-02-26
Attending: OBSTETRICS & GYNECOLOGY
Payer: OTHER GOVERNMENT

## 2025-02-26 VITALS
RESPIRATION RATE: 18 BRPM | HEART RATE: 90 BPM | TEMPERATURE: 98 F | DIASTOLIC BLOOD PRESSURE: 87 MMHG | SYSTOLIC BLOOD PRESSURE: 136 MMHG | OXYGEN SATURATION: 96 %

## 2025-02-26 DIAGNOSIS — D64.9 ANEMIA, UNSPECIFIED TYPE: ICD-10-CM

## 2025-02-26 DIAGNOSIS — R51.9 NONINTRACTABLE HEADACHE, UNSPECIFIED CHRONICITY PATTERN, UNSPECIFIED HEADACHE TYPE: ICD-10-CM

## 2025-02-26 LAB
ABO + RH BLD: NORMAL
ALBUMIN SERPL BCP-MCNC: 3.5 G/DL (ref 3.5–5.2)
ALP SERPL-CCNC: 129 U/L (ref 40–150)
ALT SERPL W/O P-5'-P-CCNC: 15 U/L (ref 10–44)
ANION GAP SERPL CALC-SCNC: 10 MMOL/L (ref 8–16)
AST SERPL-CCNC: 18 U/L (ref 10–40)
BASOPHILS # BLD AUTO: 0.05 K/UL (ref 0–0.2)
BASOPHILS NFR BLD: 0.3 % (ref 0–1.9)
BILIRUB SERPL-MCNC: 0.2 MG/DL (ref 0.1–1)
BLD GP AB SCN CELLS X3 SERPL QL: NORMAL
BUN SERPL-MCNC: 10 MG/DL (ref 6–20)
CALCIUM SERPL-MCNC: 9.7 MG/DL (ref 8.7–10.5)
CHLORIDE SERPL-SCNC: 108 MMOL/L (ref 95–110)
CO2 SERPL-SCNC: 21 MMOL/L (ref 23–29)
CREAT SERPL-MCNC: 0.6 MG/DL (ref 0.5–1.4)
DIFFERENTIAL METHOD BLD: ABNORMAL
EOSINOPHIL # BLD AUTO: 0.1 K/UL (ref 0–0.5)
EOSINOPHIL NFR BLD: 0.9 % (ref 0–8)
ERYTHROCYTE [DISTWIDTH] IN BLOOD BY AUTOMATED COUNT: 14 % (ref 11.5–14.5)
EST. GFR  (NO RACE VARIABLE): >60 ML/MIN/1.73 M^2
GLUCOSE SERPL-MCNC: 71 MG/DL (ref 70–110)
HCT VFR BLD AUTO: 27.6 % (ref 37–48.5)
HGB BLD-MCNC: 9.2 G/DL (ref 12–16)
IMM GRANULOCYTES # BLD AUTO: 0.13 K/UL (ref 0–0.04)
IMM GRANULOCYTES NFR BLD AUTO: 0.8 % (ref 0–0.5)
LYMPHOCYTES # BLD AUTO: 3 K/UL (ref 1–4.8)
LYMPHOCYTES NFR BLD: 19 % (ref 18–48)
MCH RBC QN AUTO: 28.8 PG (ref 27–31)
MCHC RBC AUTO-ENTMCNC: 33.3 G/DL (ref 32–36)
MCV RBC AUTO: 86 FL (ref 82–98)
MONOCYTES # BLD AUTO: 1 K/UL (ref 0.3–1)
MONOCYTES NFR BLD: 6.3 % (ref 4–15)
NEUTROPHILS # BLD AUTO: 11.5 K/UL (ref 1.8–7.7)
NEUTROPHILS NFR BLD: 72.7 % (ref 38–73)
NRBC BLD-RTO: 0 /100 WBC
PLATELET # BLD AUTO: 572 K/UL (ref 150–450)
PMV BLD AUTO: 8.8 FL (ref 9.2–12.9)
POTASSIUM SERPL-SCNC: 4.5 MMOL/L (ref 3.5–5.1)
PROT SERPL-MCNC: 8 G/DL (ref 6–8.4)
RBC # BLD AUTO: 3.2 M/UL (ref 4–5.4)
SODIUM SERPL-SCNC: 139 MMOL/L (ref 136–145)
SPECIMEN OUTDATE: NORMAL
WBC # BLD AUTO: 15.85 K/UL (ref 3.9–12.7)

## 2025-02-26 PROCEDURE — 63600175 PHARM REV CODE 636 W HCPCS

## 2025-02-26 PROCEDURE — 99284 EMERGENCY DEPT VISIT MOD MDM: CPT | Mod: 25

## 2025-02-26 PROCEDURE — 85025 COMPLETE CBC W/AUTO DIFF WBC: CPT | Performed by: OBSTETRICS & GYNECOLOGY

## 2025-02-26 PROCEDURE — 80053 COMPREHEN METABOLIC PANEL: CPT

## 2025-02-26 PROCEDURE — 86900 BLOOD TYPING SEROLOGIC ABO: CPT

## 2025-02-26 PROCEDURE — 96374 THER/PROPH/DIAG INJ IV PUSH: CPT

## 2025-02-26 RX ORDER — FERROUS SULFATE 325(65) MG
325 TABLET ORAL
Qty: 60 TABLET | Refills: 1 | Status: SHIPPED | OUTPATIENT
Start: 2025-02-26

## 2025-02-26 RX ORDER — ACETAMINOPHEN 500 MG
1000 TABLET ORAL ONCE
Status: DISCONTINUED | OUTPATIENT
Start: 2025-02-26 | End: 2025-02-26

## 2025-02-26 RX ORDER — LABETALOL HYDROCHLORIDE 5 MG/ML
20 INJECTION, SOLUTION INTRAVENOUS ONCE
Status: COMPLETED | OUTPATIENT
Start: 2025-02-26 | End: 2025-02-26

## 2025-02-26 RX ADMIN — LABETALOL HYDROCHLORIDE 20 MG: 5 INJECTION INTRAVENOUS at 09:02

## 2025-02-26 NOTE — ED PROVIDER NOTES
Encounter Date: 2025       History     Chief Complaint   Patient presents with    Hypertension     Josie Rouse is a 34 y.o. , PPD#8 s/p  who presents to the OB ED today (2025) with CC of elevated BPs.     Pt reports to [facility] with complaints of elevated BP on connected moms. Of note, she is PPD#8, s/p mag sulfate for preE w/ SF (BP) at time of delivery. She was discharged on procardia 90 XR daily for BP control. Today, pt reports feeling headaches sporadically that resolve with drinking water and minor fatigue with walking. She has been back and forth visiting her baby in the NICU.    Patient denies pain, SOB, is eating normally, breastfeeding, normal bladder and bowel movements, no vaginal discharge. She denies headache, vision changes, SOB, RUQ pain, or edema.     This pregnancy was complicated by preE w/ SF, ABLA. Anxiety/panic disorder, MO, and asthma.       The history is provided by the patient. No  was used.     Review of patient's allergies indicates:  No Known Allergies  Past Medical History:   Diagnosis Date    Asthma      Past Surgical History:   Procedure Laterality Date    APPENDECTOMY       Family History   Problem Relation Name Age of Onset    Breast cancer Neg Hx      Colon cancer Neg Hx      Ovarian cancer Neg Hx       Social History[1]  Review of Systems   Constitutional:  Positive for fatigue.   Eyes:  Negative for visual disturbance.   Respiratory:  Negative for chest tightness and shortness of breath.    Cardiovascular:  Negative for chest pain.   Gastrointestinal:  Negative for abdominal pain and constipation.   Genitourinary:  Negative for difficulty urinating.   Neurological:  Positive for light-headedness and headaches.       Physical Exam     Initial Vitals   BP Pulse Resp Temp SpO2   25 0910 25 0907 25 0906 25 0906 25 0909   (!) 129/90 109 18 97.7 °F (36.5 °C) 97 %      MAP       --                Physical  Exam    Vitals reviewed.  Constitutional: She appears well-developed and well-nourished. No distress.   HENT:   Head: Normocephalic and atraumatic.   Eyes: Conjunctivae and EOM are normal.   Neck:   Normal range of motion.  Cardiovascular:  Normal rate and regular rhythm.           Pulmonary/Chest: Breath sounds normal. No respiratory distress.   Abdominal: Abdomen is soft. She exhibits no distension. There is no abdominal tenderness.   No fundal tenderness  There is no rebound and no guarding.   Musculoskeletal:         General: Normal range of motion.      Cervical back: Normal range of motion.     Neurological: She is alert and oriented to person, place, and time.   Skin: Skin is warm and dry. Capillary refill takes more than 3 seconds.   Psychiatric: She has a normal mood and affect.         ED Course   Procedures  Labs Reviewed   CBC W/ AUTO DIFFERENTIAL - Abnormal       Result Value    WBC 15.85 (*)     RBC 3.20 (*)     Hemoglobin 9.2 (*)     Hematocrit 27.6 (*)     MCV 86      MCH 28.8      MCHC 33.3      RDW 14.0      Platelets 572 (*)     MPV 8.8 (*)     Immature Granulocytes 0.8 (*)     Gran # (ANC) 11.5 (*)     Immature Grans (Abs) 0.13 (*)     Lymph # 3.0      Mono # 1.0      Eos # 0.1      Baso # 0.05      nRBC 0      Gran % 72.7      Lymph % 19.0      Mono % 6.3      Eosinophil % 0.9      Basophil % 0.3      Differential Method Automated     COMPREHENSIVE METABOLIC PANEL - Abnormal    Sodium 139      Potassium 4.5      Chloride 108      CO2 21 (*)     Glucose 71      BUN 10      Creatinine 0.6      Calcium 9.7      Total Protein 8.0      Albumin 3.5      Total Bilirubin 0.2      Alkaline Phosphatase 129      AST 18      ALT 15      eGFR >60      Anion Gap 10     TYPE & SCREEN    Group & Rh A POS      Indirect Yamileth NEG      Specimen Outdate 03/01/2025 23:59            Imaging Results    None          Medications   labetaloL injection 20 mg (20 mg Intravenous Given 2/26/25 7308)     Medical Decision  Des Rouse is a 34 y.o. , PPD#8 s/p  who presents to the OB ED today (2025) with CC of elevated Bps.    preE w/ SF  -PPD#8 s/p magnesium, on procardia 90 XL  -asymptomatic  -VS as above  -s/p labetalol 20mg for elevated pressures, Bps subsequently normal range  -CBC, CMP WNL  -pt counseled to continue procardia 90 XL as prescribed, hydrate well, rest and monitor Bps at home    ABLA   -Pt reports fatigue with walking / exercise   - Pt reports not taking PO iron as prescribed   - H/H 9.2/27.6, stable  - iron  sent to pharmacy   - advised adequate fluid hydration, iron-rich diet    Hanna Drew MD (Mary)   Obstetrics and Gynecology, PGY1      Amount and/or Complexity of Data Reviewed  Labs: ordered.    Risk  OTC drugs.  Prescription drug management.              Attending Attestation:   Physician Attestation Statement for Resident:  As the supervising MD   Physician Attestation Statement: I have personally seen and examined this patient.   I agree with the above history.  -:   As the supervising MD I agree with the above PE.     As the supervising MD I agree with the above treatment, course, plan, and disposition.   I was personally present during the critical portions of the procedure(s) performed by the resident and was immediately available in the ED to provide services and assistance as needed during the entire procedure.  I have reviewed and agree with the residents interpretation of the following: lab data.  I have reviewed the following: old records at this facility.            Attending ED Notes:   I saw and examined the patient myself along with the resident. I have personally reviewed pertinent prior records, labs, imaging, and procedures. I have reviewed the documentation and agree with the findings and plan as documented.      PPD8 s/p  complicated by pre-eclampsia with severe features presenting with elevated BPs at home. Reports BPs range from normal to mild range,  no severe range BPs. Occasional headache that resolves with hydration, otherwise asymptomatic. BPs normal to mild range in OB ED, 2 BPs >150/100, improved to normal range with Labetalol. Labs without evidence of worsening pre-eclampsia. Counseled regarding risks/benefits of adjusting antihypertensive at this time, and recommended close monitoring of BPs at home with close outpatient follow up. Discussed adequate fluid intake, Iron supplement/Iron-rich diet, and rest. Discharge home in stable condition. Return precautions discussed.    Laurita Wang MD FACOG  OB Hospitalist                                 Clinical Impression:  Final diagnoses:  [O14.95] Preeclampsia in postpartum period (Primary)  [R51.9] Nonintractable headache, unspecified chronicity pattern, unspecified headache type  [D64.9] Anemia, unspecified type          ED Disposition Condition    Discharge Stable          ED Prescriptions       Medication Sig Dispense Start Date End Date Auth. Provider    ferrous sulfate (IRON) 325 mg (65 mg iron) Tab tablet Take 1 tablet (325 mg total) by mouth daily with breakfast. 60 tablet 2025 -- Melita Drew MD          Follow-up Information       Follow up With Specialties Details Why Contact Info    [facility] (Obstetrics) Emergency Medicine  If symptoms worsen [address] [facility] [address]  [phone]    Ansley Peterson MD Obstetrics and Gynecology, Obstetrics  BP check in 3-5 days [address] [address]  [phone]                 Melita Drew MD  Resident  25 1030         [1]   Social History  Tobacco Use    Smoking status: Former     Current packs/day: 0.00     Average packs/day: 0.3 packs/day for 0.5 years (0.1 ttl pk-yrs)     Types: Cigarettes     Start date: 2019     Quit date: 2020     Years since quittin.1     Passive exposure: Past    Smokeless tobacco: Never   Substance Use Topics    Alcohol use: Not Currently      Alcohol/week: 2.0 standard drinks of alcohol     Types: 2 Glasses of wine per week     Comment: socially     Drug use: No        Laurita Wang MD  02/26/25 0887

## 2025-02-27 ENCOUNTER — LACTATION ENCOUNTER (OUTPATIENT)
Dept: INTENSIVE CARE | Facility: OTHER | Age: 35
End: 2025-02-27

## 2025-02-27 NOTE — LACTATION NOTE
This note was copied from a baby's chart.  Lactation call to mom:  Westley scheduled with Shannon for 1100 tomorrow with lactation d/c education.

## 2025-02-27 NOTE — LACTATION NOTE
This note was copied from a baby's chart.  NICU Lactation Discharge Note:    Latch assist: LC assisted mother with breastfeeding session. Mother mostly independent. LC assisted mother with obtaining a deeper latch. Encouraged deeper latch for effective breastfeeding. Nipples noted to be compressed after breastfeeding. Encouraged breast compressions with suckling to help with transfer of milk. Infant eager and suckled actively. Deeper tugs and pulls and some swallows noted when mother used breast compressions. Infant transferred 18 ml at breast. Discussed transition plan to breastfeed x 20-30 min then offer 30 ml supplement after breast until infant is effectively breastfeeding and appears full and content. Encouraged pumping post breastfeeding until infant fully established to the breast with a full milk supply.   Discussed importance of a deep latch, signs of a good latch, signs of milk transfer, and how to know if baby is getting enough.     Feeding plan for home: Under the guidance of the Pediatrician mother to continue transition to exclusive breast feeding as desires; encouraged mother to put baby to breast on demand when early hunger cues are observed 8 or more times in 24-hour period; if signs of an effective latch and active milk transfer are noted, mother to allow baby to nurse until content; mother to continue supplement of expressed breast milk (or formula) as needed until exclusive breast feeding is well established; mother to closely monitor for signs that baby is getting enough (hydration, calories) at breast AEB at least 5-6 heavy, wet diapers/day, 3-4 loose, yellow seedy stools/day, and once birth weight is regained by day 10-14, a continued weight gain of 5-7 ounces/week; mother to follow-up with the Pediatrician for weight checks and as scheduled/needed.   Mom encouraged to double pump until empty after breast feeding until infant fully established at breast and a full breast milk supply is  established.    Early feeding cues: Sucking on fingers or hands or bringing hands toward the mouth                                  Sucking motions with mouth or tongue                                  Rooting or turning toward an object that brushes your baby's mouth                                  Acting fretful           Try to latch the baby onto the breast until deep latch occurs or until 10 minutes pass. If unable to latch baby onto breasts or you do not see or hear any signs that baby is getting milk from your breast, bottle feed your baby.  Completed NICU lactation discharge teaching with good understanding verbalized by mother.  Provided mother with written handouts to reinforce verbal instructions.  Encouraged mother to participate in a breast feeding support group to facilitate meeting her breast feeding goals.  Provided mother with list of lactation community resources as well as NICU lactation contact numbers.  Laverne Loyola, BSN, RNC, CLC, IBCLC

## 2025-04-01 ENCOUNTER — PATIENT MESSAGE (OUTPATIENT)
Dept: OBSTETRICS AND GYNECOLOGY | Facility: CLINIC | Age: 35
End: 2025-04-01

## 2025-04-01 ENCOUNTER — POSTPARTUM VISIT (OUTPATIENT)
Dept: OBSTETRICS AND GYNECOLOGY | Facility: CLINIC | Age: 35
End: 2025-04-01
Payer: OTHER GOVERNMENT

## 2025-04-01 VITALS — DIASTOLIC BLOOD PRESSURE: 76 MMHG | SYSTOLIC BLOOD PRESSURE: 118 MMHG | BODY MASS INDEX: 40.23 KG/M2 | HEIGHT: 60 IN

## 2025-04-01 PROCEDURE — 99999 PR PBB SHADOW E&M-EST. PATIENT-LVL III: CPT | Mod: PBBFAC,,, | Performed by: OBSTETRICS & GYNECOLOGY

## 2025-04-01 PROCEDURE — 0503F POSTPARTUM CARE VISIT: CPT | Mod: ,,, | Performed by: OBSTETRICS & GYNECOLOGY

## 2025-04-01 PROCEDURE — 99213 OFFICE O/P EST LOW 20 MIN: CPT | Mod: PBBFAC | Performed by: OBSTETRICS & GYNECOLOGY

## 2025-04-01 RX ORDER — NIFEDIPINE 30 MG/1
30 TABLET, EXTENDED RELEASE ORAL DAILY
Qty: 30 TABLET | Refills: 2 | Status: SHIPPED | OUTPATIENT
Start: 2025-04-01 | End: 2026-04-01

## 2025-04-01 NOTE — PROGRESS NOTES
CC: Post-partum follow-up    Josie Rouse is a 34 y.o. female  presents for post-partum visit s/p a .    Delivery Date: 2025  Delivery MD: Ansley Peterson  Gender: female  Birth Weight: 6 pounds 5 ounces  Breast Feeding: YES  Depression: NO  Contraception: no method    Pregnancy was complicated by: Pre E with Severe Features at 37w 3d requiring IOL. She was on magnesium sulfate during and after delivery. Still on Procardia 90 mg.        /76   Ht 5' (1.524 m)   LMP 2024   Breastfeeding Yes   BMI 40.23 kg/m²     ROS:  GENERAL: No fever, chills, fatigability or weight loss.  VULVAR: No pain, no lesions and no itching.  VAGINAL: No relaxation, no itching, no discharge, no abnormal bleeding and no lesions.  ABDOMEN: No abdominal pain. Denies nausea. Denies vomiting. No diarrhea. No constipation  BREAST: Denies pain. No lumps. No discharge.  URINARY: No incontinence, no nocturia, no frequency and no dysuria.  CARDIOVASCULAR: No chest pain. No shortness of breath. No leg cramps.  NEUROLOGICAL: No headaches. No vision changes.    PHYSICAL EXAM:  PELVIC: External exam normal.Perineal laceration well healed.     PROCEDURES: None        Diagnosis:  1. Routine postpartum follow-up        Plan:   - Decrease Procardia to 30 mg daily x 2 weeks. Will continue Connected MOM x 6 more weeks.   - will wean off Procardia in 2 weeks if BPs normal.          Patient was counseled today on A.C.S. Pap guidelines and recommendations for yearly pelvic exams and monthly self breast exams; to see her PCP for other health maintenance.    FOLLOW UP: in 1 year for routine exam; pap due in 2027

## 2025-04-07 ENCOUNTER — PATIENT MESSAGE (OUTPATIENT)
Dept: OBSTETRICS AND GYNECOLOGY | Facility: CLINIC | Age: 35
End: 2025-04-07
Payer: OTHER GOVERNMENT

## 2025-04-15 ENCOUNTER — TELEPHONE (OUTPATIENT)
Dept: OBSTETRICS AND GYNECOLOGY | Facility: CLINIC | Age: 35
End: 2025-04-15
Payer: OTHER GOVERNMENT

## 2025-05-21 ENCOUNTER — OFFICE VISIT (OUTPATIENT)
Dept: FAMILY MEDICINE | Facility: CLINIC | Age: 35
End: 2025-05-21
Payer: OTHER GOVERNMENT

## 2025-05-21 VITALS
OXYGEN SATURATION: 97 % | BODY MASS INDEX: 30.86 KG/M2 | HEIGHT: 60 IN | DIASTOLIC BLOOD PRESSURE: 88 MMHG | WEIGHT: 157.19 LBS | SYSTOLIC BLOOD PRESSURE: 132 MMHG | HEART RATE: 100 BPM | RESPIRATION RATE: 18 BRPM

## 2025-05-21 DIAGNOSIS — Z87.59 H/O PRE-ECLAMPSIA: ICD-10-CM

## 2025-05-21 DIAGNOSIS — R03.0 ELEVATED BLOOD PRESSURE READING: Primary | ICD-10-CM

## 2025-05-21 LAB
OHS QRS DURATION: 78 MS
OHS QTC CALCULATION: 442 MS

## 2025-05-21 PROCEDURE — 81003 URINALYSIS AUTO W/O SCOPE: CPT | Performed by: INTERNAL MEDICINE

## 2025-05-21 PROCEDURE — 93010 ELECTROCARDIOGRAM REPORT: CPT | Mod: S$PBB,,, | Performed by: INTERNAL MEDICINE

## 2025-05-21 PROCEDURE — 93005 ELECTROCARDIOGRAM TRACING: CPT | Mod: PBBFAC,PN | Performed by: INTERNAL MEDICINE

## 2025-05-21 PROCEDURE — 99213 OFFICE O/P EST LOW 20 MIN: CPT | Mod: PBBFAC,25,PN | Performed by: INTERNAL MEDICINE

## 2025-05-21 PROCEDURE — 99999 PR PBB SHADOW E&M-EST. PATIENT-LVL III: CPT | Mod: PBBFAC,,, | Performed by: INTERNAL MEDICINE

## 2025-05-21 PROCEDURE — 99203 OFFICE O/P NEW LOW 30 MIN: CPT | Mod: S$PBB,,, | Performed by: INTERNAL MEDICINE

## 2025-05-21 NOTE — PROGRESS NOTES
Subjective:       Patient ID: Josie Rouse is a 34 y.o. female.    Chief Complaint: Establish Care    Est primary care    HPI: 33 y/o presents with infant daughter to establish PCP> had  in February complicated by pre eclampisa and post partum HTN she was prescribed nifedipine but has not taken for last month. No palpitations no CP no GONZALES no LE swselling no change in urinary frequency. She is pumping and bottlefeeding breast milks no personal history of HTN or DM    FamHx: mother and brother with HTN      Review of Systems   Constitutional:  Negative for activity change, appetite change, fatigue, fever and unexpected weight change.   HENT:  Negative for ear pain, rhinorrhea and sore throat.    Eyes:  Negative for discharge and visual disturbance.   Respiratory:  Negative for chest tightness, shortness of breath and wheezing.    Cardiovascular:  Negative for chest pain, palpitations and leg swelling.   Gastrointestinal:  Negative for abdominal pain, constipation and diarrhea.   Endocrine: Negative for cold intolerance and heat intolerance.   Genitourinary:  Negative for dysuria and hematuria.   Musculoskeletal:  Negative for joint swelling and neck stiffness.   Skin:  Negative for rash.   Neurological:  Negative for dizziness, syncope, weakness and headaches.   Psychiatric/Behavioral:  Negative for suicidal ideas.        Objective:     Vitals:    25 1455   BP: 132/88   BP Location: Left arm   Patient Position: Sitting   Pulse: 100   Resp: 18   SpO2: 97%   Weight: 71.3 kg (157 lb 3 oz)   Height: 5' (1.524 m)          Physical Exam  Constitutional:       General: She is not in acute distress.  HENT:      Head: Normocephalic and atraumatic.   Eyes:      General: No scleral icterus.  Cardiovascular:      Rate and Rhythm: Normal rate and regular rhythm.      Pulses: Normal pulses.      Heart sounds: Normal heart sounds. No murmur heard.  Pulmonary:      Effort: Pulmonary effort is normal. No respiratory  distress.      Breath sounds: Normal breath sounds. No wheezing.   Abdominal:      General: There is no distension.      Palpations: Abdomen is soft.      Tenderness: There is no right CVA tenderness or left CVA tenderness.   Musculoskeletal:      Right lower leg: No edema.      Left lower leg: No edema.   Neurological:      General: No focal deficit present.      Mental Status: She is alert and oriented to person, place, and time.   Psychiatric:         Mood and Affect: Mood normal.         Behavior: Behavior normal.       EKG sinus rhytm rate 88 no LVH  Assessment and Plan   1. Elevated blood pressure reading  Off all anti HTN bp just above goal will get labs for secondary causes   - CBC Without Differential; Future  - Comprehensive Metabolic Panel; Future  - Urinalysis  - EKG 12-lead    2. H/O pre-eclampsia  Resolved check urine for protein

## 2025-05-22 ENCOUNTER — RESULTS FOLLOW-UP (OUTPATIENT)
Dept: FAMILY MEDICINE | Facility: CLINIC | Age: 35
End: 2025-05-22

## 2025-05-22 LAB
BILIRUB UR QL STRIP.AUTO: NEGATIVE
CLARITY UR: CLEAR
COLOR UR AUTO: YELLOW
GLUCOSE UR QL STRIP: NEGATIVE
HGB UR QL STRIP: NEGATIVE
KETONES UR QL STRIP: NEGATIVE
LEUKOCYTE ESTERASE UR QL STRIP: NEGATIVE
NITRITE UR QL STRIP: NEGATIVE
PH UR STRIP: 6 [PH]
PROT UR QL STRIP: NEGATIVE
SP GR UR STRIP: 1.02
UROBILINOGEN UR STRIP-ACNC: NEGATIVE EU/DL

## 2025-05-27 ENCOUNTER — LAB VISIT (OUTPATIENT)
Dept: LAB | Facility: HOSPITAL | Age: 35
End: 2025-05-27
Attending: INTERNAL MEDICINE
Payer: OTHER GOVERNMENT

## 2025-05-27 DIAGNOSIS — R03.0 ELEVATED BLOOD PRESSURE READING: ICD-10-CM

## 2025-05-27 LAB
ALBUMIN SERPL BCP-MCNC: 4.2 G/DL (ref 3.5–5.2)
ALP SERPL-CCNC: 82 UNIT/L (ref 40–150)
ALT SERPL W/O P-5'-P-CCNC: 18 UNIT/L (ref 10–44)
ANION GAP (OHS): 8 MMOL/L (ref 8–16)
AST SERPL-CCNC: 17 UNIT/L (ref 11–45)
BILIRUB SERPL-MCNC: 0.1 MG/DL (ref 0.1–1)
BUN SERPL-MCNC: 21 MG/DL (ref 6–20)
CALCIUM SERPL-MCNC: 9.4 MG/DL (ref 8.7–10.5)
CHLORIDE SERPL-SCNC: 108 MMOL/L (ref 95–110)
CO2 SERPL-SCNC: 24 MMOL/L (ref 23–29)
CREAT SERPL-MCNC: 0.8 MG/DL (ref 0.5–1.4)
ERYTHROCYTE [DISTWIDTH] IN BLOOD BY AUTOMATED COUNT: 15.2 % (ref 11.5–14.5)
GFR SERPLBLD CREATININE-BSD FMLA CKD-EPI: >60 ML/MIN/1.73/M2
GLUCOSE SERPL-MCNC: 78 MG/DL (ref 70–110)
HCT VFR BLD AUTO: 34.4 % (ref 37–48.5)
HGB BLD-MCNC: 10.3 GM/DL (ref 12–16)
MCH RBC QN AUTO: 23.7 PG (ref 27–31)
MCHC RBC AUTO-ENTMCNC: 29.9 G/DL (ref 32–36)
MCV RBC AUTO: 79 FL (ref 82–98)
PLATELET # BLD AUTO: 469 K/UL (ref 150–450)
PMV BLD AUTO: 9.6 FL (ref 9.2–12.9)
POTASSIUM SERPL-SCNC: 4.2 MMOL/L (ref 3.5–5.1)
PROT SERPL-MCNC: 7.8 GM/DL (ref 6–8.4)
RBC # BLD AUTO: 4.34 M/UL (ref 4–5.4)
SODIUM SERPL-SCNC: 140 MMOL/L (ref 136–145)
WBC # BLD AUTO: 10.63 K/UL (ref 3.9–12.7)

## 2025-05-27 PROCEDURE — 80053 COMPREHEN METABOLIC PANEL: CPT

## 2025-05-27 PROCEDURE — 36415 COLL VENOUS BLD VENIPUNCTURE: CPT | Mod: PN

## 2025-05-27 PROCEDURE — 85027 COMPLETE CBC AUTOMATED: CPT
